# Patient Record
Sex: MALE | Race: WHITE | NOT HISPANIC OR LATINO | URBAN - METROPOLITAN AREA
[De-identification: names, ages, dates, MRNs, and addresses within clinical notes are randomized per-mention and may not be internally consistent; named-entity substitution may affect disease eponyms.]

---

## 2017-02-03 ENCOUNTER — APPOINTMENT (EMERGENCY)
Dept: CT IMAGING | Facility: HOSPITAL | Age: 52
End: 2017-02-03
Payer: COMMERCIAL

## 2017-02-03 ENCOUNTER — HOSPITAL ENCOUNTER (EMERGENCY)
Facility: HOSPITAL | Age: 52
Discharge: HOME | End: 2017-02-03
Admitting: EMERGENCY MEDICINE
Payer: COMMERCIAL

## 2017-02-03 VITALS
HEART RATE: 78 BPM | RESPIRATION RATE: 18 BRPM | BODY MASS INDEX: 24.34 KG/M2 | TEMPERATURE: 98 F | OXYGEN SATURATION: 99 % | DIASTOLIC BLOOD PRESSURE: 88 MMHG | WEIGHT: 170 LBS | HEIGHT: 70 IN | SYSTOLIC BLOOD PRESSURE: 129 MMHG

## 2017-02-03 DIAGNOSIS — N20.0 KIDNEY STONE ON LEFT SIDE: Primary | ICD-10-CM

## 2017-02-03 LAB
ALBUMIN SERPL BCP-MCNC: 4.1 G/DL (ref 3.5–5)
ALP SERPL-CCNC: 100 U/L (ref 46–116)
ALT SERPL W P-5'-P-CCNC: 32 U/L (ref 12–78)
ANION GAP SERPL CALCULATED.3IONS-SCNC: 9 MMOL/L (ref 4–13)
AST SERPL W P-5'-P-CCNC: 21 U/L (ref 5–45)
BASOPHILS # BLD AUTO: 0.03 THOUSANDS/ΜL (ref 0–0.1)
BASOPHILS NFR BLD AUTO: 0 % (ref 0–1)
BILIRUB SERPL-MCNC: 0.4 MG/DL (ref 0.2–1)
BUN SERPL-MCNC: 13 MG/DL (ref 5–25)
CALCIUM SERPL-MCNC: 9 MG/DL (ref 8.3–10.1)
CHLORIDE SERPL-SCNC: 101 MMOL/L (ref 100–108)
CLARITY, POC: CLEAR
CO2 SERPL-SCNC: 28 MMOL/L (ref 21–32)
COLOR, POC: YELLOW
CREAT SERPL-MCNC: 1.22 MG/DL (ref 0.6–1.3)
EOSINOPHIL # BLD AUTO: 0.06 THOUSAND/ΜL (ref 0–0.61)
EOSINOPHIL NFR BLD AUTO: 1 % (ref 0–6)
ERYTHROCYTE [DISTWIDTH] IN BLOOD BY AUTOMATED COUNT: 15.3 % (ref 11.6–15.1)
EXT BILIRUBIN, UA: NORMAL
EXT BLOOD URINE: NORMAL
EXT GLUCOSE, UA: NORMAL
EXT KETONES: NORMAL
EXT NITRITE, UA: NORMAL
EXT PH, UA: 7
EXT PROTEIN, UA: NORMAL
EXT SPECIFIC GRAVITY, UA: 1.01
EXT UROBILINOGEN: NORMAL
GFR SERPL CREATININE-BSD FRML MDRD: >60 ML/MIN/1.73SQ M
GLUCOSE SERPL-MCNC: 118 MG/DL (ref 65–140)
HCT VFR BLD AUTO: 45 % (ref 36.5–49.3)
HGB BLD-MCNC: 15.2 G/DL (ref 12–17)
LIPASE SERPL-CCNC: 108 U/L (ref 73–393)
LYMPHOCYTES # BLD AUTO: 1.16 THOUSANDS/ΜL (ref 0.6–4.47)
LYMPHOCYTES NFR BLD AUTO: 12 % (ref 14–44)
MCH RBC QN AUTO: 26.9 PG (ref 26.8–34.3)
MCHC RBC AUTO-ENTMCNC: 33.8 G/DL (ref 31.4–37.4)
MCV RBC AUTO: 80 FL (ref 82–98)
MONOCYTES # BLD AUTO: 0.55 THOUSAND/ΜL (ref 0.17–1.22)
MONOCYTES NFR BLD AUTO: 6 % (ref 4–12)
NEUTROPHILS # BLD AUTO: 7.63 THOUSANDS/ΜL (ref 1.85–7.62)
NEUTS SEG NFR BLD AUTO: 81 % (ref 43–75)
PLATELET # BLD AUTO: 176 THOUSANDS/UL (ref 149–390)
PMV BLD AUTO: 10.6 FL (ref 8.9–12.7)
POTASSIUM SERPL-SCNC: 3.9 MMOL/L (ref 3.5–5.3)
PROT SERPL-MCNC: 8 G/DL (ref 6.4–8.2)
RBC # BLD AUTO: 5.66 MILLION/UL (ref 3.88–5.62)
SODIUM SERPL-SCNC: 138 MMOL/L (ref 136–145)
WBC # BLD AUTO: 9.43 THOUSAND/UL (ref 4.31–10.16)
WBC # BLD EST: NORMAL 10*3/UL

## 2017-02-03 PROCEDURE — 81002 URINALYSIS NONAUTO W/O SCOPE: CPT | Performed by: PHYSICIAN ASSISTANT

## 2017-02-03 PROCEDURE — 74177 CT ABD & PELVIS W/CONTRAST: CPT

## 2017-02-03 PROCEDURE — 96361 HYDRATE IV INFUSION ADD-ON: CPT

## 2017-02-03 PROCEDURE — 99284 EMERGENCY DEPT VISIT MOD MDM: CPT

## 2017-02-03 PROCEDURE — 85025 COMPLETE CBC W/AUTO DIFF WBC: CPT | Performed by: PHYSICIAN ASSISTANT

## 2017-02-03 PROCEDURE — 80053 COMPREHEN METABOLIC PANEL: CPT | Performed by: PHYSICIAN ASSISTANT

## 2017-02-03 PROCEDURE — 96375 TX/PRO/DX INJ NEW DRUG ADDON: CPT

## 2017-02-03 PROCEDURE — 96374 THER/PROPH/DIAG INJ IV PUSH: CPT

## 2017-02-03 PROCEDURE — 83690 ASSAY OF LIPASE: CPT | Performed by: PHYSICIAN ASSISTANT

## 2017-02-03 PROCEDURE — 36415 COLL VENOUS BLD VENIPUNCTURE: CPT | Performed by: PHYSICIAN ASSISTANT

## 2017-02-03 RX ORDER — KETOROLAC TROMETHAMINE 30 MG/ML
30 INJECTION, SOLUTION INTRAMUSCULAR; INTRAVENOUS ONCE
Status: COMPLETED | OUTPATIENT
Start: 2017-02-03 | End: 2017-02-03

## 2017-02-03 RX ORDER — OXYCODONE HYDROCHLORIDE AND ACETAMINOPHEN 5; 325 MG/1; MG/1
1 TABLET ORAL EVERY 4 HOURS PRN
Qty: 18 TABLET | Refills: 0 | Status: SHIPPED | OUTPATIENT
Start: 2017-02-03 | End: 2017-06-02

## 2017-02-03 RX ORDER — ONDANSETRON 2 MG/ML
4 INJECTION INTRAMUSCULAR; INTRAVENOUS ONCE
Status: COMPLETED | OUTPATIENT
Start: 2017-02-03 | End: 2017-02-03

## 2017-02-03 RX ORDER — TAMSULOSIN HYDROCHLORIDE 0.4 MG/1
0.4 CAPSULE ORAL
Qty: 7 CAPSULE | Refills: 0 | Status: SHIPPED | OUTPATIENT
Start: 2017-02-03 | End: 2017-06-02

## 2017-02-03 RX ADMIN — IOHEXOL 100 ML: 350 INJECTION, SOLUTION INTRAVENOUS at 13:58

## 2017-02-03 RX ADMIN — SODIUM CHLORIDE 1000 ML: 0.9 INJECTION, SOLUTION INTRAVENOUS at 13:04

## 2017-02-03 RX ADMIN — KETOROLAC TROMETHAMINE 30 MG: 30 INJECTION, SOLUTION INTRAMUSCULAR at 13:03

## 2017-02-03 RX ADMIN — ONDANSETRON 4 MG: 2 INJECTION INTRAMUSCULAR; INTRAVENOUS at 13:02

## 2017-02-09 ENCOUNTER — ANESTHESIA (OUTPATIENT)
Dept: PERIOP | Facility: HOSPITAL | Age: 52
End: 2017-02-09
Payer: COMMERCIAL

## 2017-02-09 ENCOUNTER — APPOINTMENT (OUTPATIENT)
Dept: RADIOLOGY | Facility: HOSPITAL | Age: 52
End: 2017-02-09
Payer: COMMERCIAL

## 2017-02-09 ENCOUNTER — HOSPITAL ENCOUNTER (OUTPATIENT)
Facility: HOSPITAL | Age: 52
Setting detail: OUTPATIENT SURGERY
Discharge: HOME | End: 2017-02-09
Attending: UROLOGY | Admitting: UROLOGY
Payer: COMMERCIAL

## 2017-02-09 ENCOUNTER — ANESTHESIA EVENT (OUTPATIENT)
Dept: PERIOP | Facility: HOSPITAL | Age: 52
End: 2017-02-09
Payer: COMMERCIAL

## 2017-02-09 VITALS
DIASTOLIC BLOOD PRESSURE: 78 MMHG | HEIGHT: 70 IN | BODY MASS INDEX: 24.34 KG/M2 | OXYGEN SATURATION: 93 % | TEMPERATURE: 98.8 F | HEART RATE: 74 BPM | RESPIRATION RATE: 16 BRPM | WEIGHT: 170 LBS | SYSTOLIC BLOOD PRESSURE: 133 MMHG

## 2017-02-09 DIAGNOSIS — N20.1 CALCULUS OF URETER: ICD-10-CM

## 2017-02-09 PROCEDURE — C1769 GUIDE WIRE: HCPCS | Performed by: UROLOGY

## 2017-02-09 PROCEDURE — 74420 UROGRAPHY RTRGR +-KUB: CPT

## 2017-02-09 PROCEDURE — 87086 URINE CULTURE/COLONY COUNT: CPT | Performed by: UROLOGY

## 2017-02-09 PROCEDURE — C2617 STENT, NON-COR, TEM W/O DEL: HCPCS | Performed by: UROLOGY

## 2017-02-09 PROCEDURE — 82360 CALCULUS ASSAY QUANT: CPT | Performed by: UROLOGY

## 2017-02-09 DEVICE — STENT URETERAL 4.7FR 26CM INLAY OPTIMA: Type: IMPLANTABLE DEVICE | Site: KIDNEY | Status: FUNCTIONAL

## 2017-02-09 RX ORDER — FENTANYL CITRATE 50 UG/ML
INJECTION, SOLUTION INTRAMUSCULAR; INTRAVENOUS AS NEEDED
Status: DISCONTINUED | OUTPATIENT
Start: 2017-02-09 | End: 2017-02-09 | Stop reason: SURG

## 2017-02-09 RX ORDER — SUCCINYLCHOLINE CHLORIDE 20 MG/ML
INJECTION INTRAMUSCULAR; INTRAVENOUS AS NEEDED
Status: DISCONTINUED | OUTPATIENT
Start: 2017-02-09 | End: 2017-02-09 | Stop reason: SURG

## 2017-02-09 RX ORDER — CEFUROXIME AXETIL 500 MG/1
500 TABLET ORAL EVERY 12 HOURS SCHEDULED
Qty: 6 TABLET | Refills: 0 | Status: SHIPPED | OUTPATIENT
Start: 2017-02-09 | End: 2017-02-12

## 2017-02-09 RX ORDER — LIDOCAINE HYDROCHLORIDE 10 MG/ML
INJECTION, SOLUTION INFILTRATION; PERINEURAL AS NEEDED
Status: DISCONTINUED | OUTPATIENT
Start: 2017-02-09 | End: 2017-02-09 | Stop reason: SURG

## 2017-02-09 RX ORDER — SODIUM CHLORIDE, SODIUM LACTATE, POTASSIUM CHLORIDE, CALCIUM CHLORIDE 600; 310; 30; 20 MG/100ML; MG/100ML; MG/100ML; MG/100ML
20 INJECTION, SOLUTION INTRAVENOUS CONTINUOUS
Status: DISCONTINUED | OUTPATIENT
Start: 2017-02-09 | End: 2017-02-09 | Stop reason: HOSPADM

## 2017-02-09 RX ORDER — PROPOFOL 10 MG/ML
INJECTION, EMULSION INTRAVENOUS AS NEEDED
Status: DISCONTINUED | OUTPATIENT
Start: 2017-02-09 | End: 2017-02-09 | Stop reason: SURG

## 2017-02-09 RX ORDER — ONDANSETRON 2 MG/ML
INJECTION INTRAMUSCULAR; INTRAVENOUS AS NEEDED
Status: DISCONTINUED | OUTPATIENT
Start: 2017-02-09 | End: 2017-02-09 | Stop reason: SURG

## 2017-02-09 RX ORDER — MIDAZOLAM HYDROCHLORIDE 1 MG/ML
INJECTION INTRAMUSCULAR; INTRAVENOUS AS NEEDED
Status: DISCONTINUED | OUTPATIENT
Start: 2017-02-09 | End: 2017-02-09 | Stop reason: SURG

## 2017-02-09 RX ORDER — OXYCODONE HYDROCHLORIDE AND ACETAMINOPHEN 5; 325 MG/1; MG/1
1 TABLET ORAL EVERY 4 HOURS PRN
Status: DISCONTINUED | OUTPATIENT
Start: 2017-02-09 | End: 2017-02-09 | Stop reason: HOSPADM

## 2017-02-09 RX ORDER — ONDANSETRON 2 MG/ML
4 INJECTION INTRAMUSCULAR; INTRAVENOUS ONCE
Status: DISCONTINUED | OUTPATIENT
Start: 2017-02-09 | End: 2017-02-09 | Stop reason: HOSPADM

## 2017-02-09 RX ORDER — SODIUM CHLORIDE, SODIUM LACTATE, POTASSIUM CHLORIDE, CALCIUM CHLORIDE 600; 310; 30; 20 MG/100ML; MG/100ML; MG/100ML; MG/100ML
150 INJECTION, SOLUTION INTRAVENOUS CONTINUOUS
Status: DISCONTINUED | OUTPATIENT
Start: 2017-02-09 | End: 2017-02-09 | Stop reason: HOSPADM

## 2017-02-09 RX ORDER — MAGNESIUM HYDROXIDE 1200 MG/15ML
LIQUID ORAL AS NEEDED
Status: DISCONTINUED | OUTPATIENT
Start: 2017-02-09 | End: 2017-02-09 | Stop reason: HOSPADM

## 2017-02-09 RX ORDER — OXYCODONE HYDROCHLORIDE AND ACETAMINOPHEN 5; 325 MG/1; MG/1
1 TABLET ORAL ONCE
Status: COMPLETED | OUTPATIENT
Start: 2017-02-09 | End: 2017-02-09

## 2017-02-09 RX ORDER — OXYCODONE HYDROCHLORIDE AND ACETAMINOPHEN 5; 325 MG/1; MG/1
1 TABLET ORAL EVERY 4 HOURS PRN
Qty: 12 TABLET | Refills: 0 | Status: SHIPPED | OUTPATIENT
Start: 2017-02-09 | End: 2017-02-12

## 2017-02-09 RX ADMIN — SODIUM CHLORIDE, SODIUM LACTATE, POTASSIUM CHLORIDE, AND CALCIUM CHLORIDE 20 ML/HR: .6; .31; .03; .02 INJECTION, SOLUTION INTRAVENOUS at 10:55

## 2017-02-09 RX ADMIN — MIDAZOLAM HYDROCHLORIDE 2 MG: 1 INJECTION, SOLUTION INTRAMUSCULAR; INTRAVENOUS at 14:34

## 2017-02-09 RX ADMIN — LIDOCAINE HYDROCHLORIDE 50 MG: 10 INJECTION, SOLUTION INFILTRATION; PERINEURAL at 14:40

## 2017-02-09 RX ADMIN — OXYCODONE HYDROCHLORIDE AND ACETAMINOPHEN 1 TABLET: 5; 325 TABLET ORAL at 12:42

## 2017-02-09 RX ADMIN — SUCCINYLCHOLINE CHLORIDE 100 MG: 20 INJECTION, SOLUTION INTRAMUSCULAR; INTRAVENOUS at 14:40

## 2017-02-09 RX ADMIN — FENTANYL CITRATE 50 MCG: 50 INJECTION, SOLUTION INTRAMUSCULAR; INTRAVENOUS at 14:40

## 2017-02-09 RX ADMIN — PROPOFOL 200 MG: 10 INJECTION, EMULSION INTRAVENOUS at 14:40

## 2017-02-09 RX ADMIN — CEFAZOLIN SODIUM 2000 MG: 2 SOLUTION INTRAVENOUS at 14:40

## 2017-02-09 RX ADMIN — DEXAMETHASONE SODIUM PHOSPHATE 10 MG: 10 INJECTION INTRAMUSCULAR; INTRAVENOUS at 14:40

## 2017-02-09 RX ADMIN — ONDANSETRON 4 MG: 2 INJECTION INTRAMUSCULAR; INTRAVENOUS at 15:06

## 2017-02-11 LAB — BACTERIA UR CULT: NORMAL

## 2017-02-14 LAB
CA PHOS MFR STONE: 5 %
CALCIUM OXALATE DIHYDRATE MFR STONE IR: 10 %
COLOR STONE: NORMAL
COM MFR STONE: 85 %
COMMENT-STONE3: NORMAL
COMPOSITION: NORMAL
LABORATORY COMMENT REPORT: NORMAL
NIDUS STONE QL: NORMAL
PHOTO: NORMAL
SIZE STONE: NORMAL MM
STONE ANALYSIS-IMP: NORMAL
SURFACE CRYSTALS: NORMAL
WT STONE: 28 MG

## 2017-06-02 ENCOUNTER — HOSPITAL ENCOUNTER (EMERGENCY)
Facility: HOSPITAL | Age: 52
Discharge: HOME | End: 2017-06-02
Attending: EMERGENCY MEDICINE | Admitting: EMERGENCY MEDICINE
Payer: COMMERCIAL

## 2017-06-02 ENCOUNTER — APPOINTMENT (EMERGENCY)
Dept: RADIOLOGY | Facility: HOSPITAL | Age: 52
End: 2017-06-02
Payer: COMMERCIAL

## 2017-06-02 ENCOUNTER — GENERIC CONVERSION - ENCOUNTER (OUTPATIENT)
Dept: OTHER | Facility: OTHER | Age: 52
End: 2017-06-02

## 2017-06-02 VITALS
OXYGEN SATURATION: 97 % | BODY MASS INDEX: 24.34 KG/M2 | SYSTOLIC BLOOD PRESSURE: 122 MMHG | DIASTOLIC BLOOD PRESSURE: 77 MMHG | RESPIRATION RATE: 16 BRPM | HEIGHT: 70 IN | TEMPERATURE: 98.2 F | HEART RATE: 75 BPM | WEIGHT: 170 LBS

## 2017-06-02 DIAGNOSIS — R07.9 CHEST PAIN: Primary | ICD-10-CM

## 2017-06-02 DIAGNOSIS — L23.7 POISON IVY: ICD-10-CM

## 2017-06-02 LAB
ALBUMIN SERPL BCP-MCNC: 3.8 G/DL (ref 3.5–5)
ALP SERPL-CCNC: 87 U/L (ref 46–116)
ALT SERPL W P-5'-P-CCNC: 33 U/L (ref 12–78)
ANION GAP SERPL CALCULATED.3IONS-SCNC: 5 MMOL/L (ref 4–13)
AST SERPL W P-5'-P-CCNC: 30 U/L (ref 5–45)
ATRIAL RATE: 69 BPM
ATRIAL RATE: 79 BPM
BASOPHILS # BLD AUTO: 0.01 THOUSANDS/ΜL (ref 0–0.1)
BASOPHILS NFR BLD AUTO: 0 % (ref 0–1)
BILIRUB DIRECT SERPL-MCNC: 0.06 MG/DL (ref 0–0.2)
BILIRUB SERPL-MCNC: 0.46 MG/DL (ref 0.2–1)
BUN SERPL-MCNC: 13 MG/DL (ref 5–25)
CALCIUM SERPL-MCNC: 9.4 MG/DL (ref 8.3–10.1)
CHLORIDE SERPL-SCNC: 105 MMOL/L (ref 100–108)
CO2 SERPL-SCNC: 30 MMOL/L (ref 21–32)
CREAT SERPL-MCNC: 1.15 MG/DL (ref 0.6–1.3)
DEPRECATED D DIMER PPP: 1570 NG/ML (FEU) (ref 0–424)
EOSINOPHIL # BLD AUTO: 0.33 THOUSAND/ΜL (ref 0–0.61)
EOSINOPHIL NFR BLD AUTO: 7 % (ref 0–6)
ERYTHROCYTE [DISTWIDTH] IN BLOOD BY AUTOMATED COUNT: 13.9 % (ref 11.6–15.1)
GFR SERPL CREATININE-BSD FRML MDRD: >60 ML/MIN/1.73SQ M
GLUCOSE SERPL-MCNC: 88 MG/DL (ref 65–140)
HCT VFR BLD AUTO: 47.1 % (ref 36.5–49.3)
HGB BLD-MCNC: 15.8 G/DL (ref 12–17)
HOLD SPECIMEN: NORMAL
LYMPHOCYTES # BLD AUTO: 0.98 THOUSANDS/ΜL (ref 0.6–4.47)
LYMPHOCYTES NFR BLD AUTO: 20 % (ref 14–44)
MCH RBC QN AUTO: 28.7 PG (ref 26.8–34.3)
MCHC RBC AUTO-ENTMCNC: 33.5 G/DL (ref 31.4–37.4)
MCV RBC AUTO: 86 FL (ref 82–98)
MONOCYTES # BLD AUTO: 0.27 THOUSAND/ΜL (ref 0.17–1.22)
MONOCYTES NFR BLD AUTO: 6 % (ref 4–12)
NEUTROPHILS # BLD AUTO: 3.34 THOUSANDS/ΜL (ref 1.85–7.62)
NEUTS SEG NFR BLD AUTO: 67 % (ref 43–75)
NRBC BLD AUTO-RTO: 0 /100 WBCS
P AXIS: 21 DEGREES
P AXIS: 66 DEGREES
PLATELET # BLD AUTO: 161 THOUSANDS/UL (ref 149–390)
PMV BLD AUTO: 10.5 FL (ref 8.9–12.7)
POTASSIUM SERPL-SCNC: 5.4 MMOL/L (ref 3.5–5.3)
PR INTERVAL: 144 MS
PR INTERVAL: 144 MS
PROT SERPL-MCNC: 7.5 G/DL (ref 6.4–8.2)
QRS AXIS: 6 DEGREES
QRS AXIS: 8 DEGREES
QRSD INTERVAL: 80 MS
QRSD INTERVAL: 84 MS
QT INTERVAL: 352 MS
QT INTERVAL: 376 MS
QTC INTERVAL: 402 MS
QTC INTERVAL: 403 MS
RBC # BLD AUTO: 5.51 MILLION/UL (ref 3.88–5.62)
SODIUM SERPL-SCNC: 140 MMOL/L (ref 136–145)
SPECIMEN SOURCE: NORMAL
SPECIMEN SOURCE: NORMAL
T WAVE AXIS: 39 DEGREES
T WAVE AXIS: 45 DEGREES
TROPONIN I BLD-MCNC: 0 NG/ML (ref 0–0.08)
TROPONIN I BLD-MCNC: 0 NG/ML (ref 0–0.08)
VENTRICULAR RATE: 69 BPM
VENTRICULAR RATE: 79 BPM
WBC # BLD AUTO: 4.95 THOUSAND/UL (ref 4.31–10.16)

## 2017-06-02 PROCEDURE — 93005 ELECTROCARDIOGRAM TRACING: CPT | Performed by: EMERGENCY MEDICINE

## 2017-06-02 PROCEDURE — 85379 FIBRIN DEGRADATION QUANT: CPT | Performed by: EMERGENCY MEDICINE

## 2017-06-02 PROCEDURE — 80076 HEPATIC FUNCTION PANEL: CPT | Performed by: EMERGENCY MEDICINE

## 2017-06-02 PROCEDURE — 36415 COLL VENOUS BLD VENIPUNCTURE: CPT

## 2017-06-02 PROCEDURE — 84484 ASSAY OF TROPONIN QUANT: CPT

## 2017-06-02 PROCEDURE — 93005 ELECTROCARDIOGRAM TRACING: CPT

## 2017-06-02 PROCEDURE — 80048 BASIC METABOLIC PNL TOTAL CA: CPT

## 2017-06-02 PROCEDURE — 99285 EMERGENCY DEPT VISIT HI MDM: CPT

## 2017-06-02 PROCEDURE — 85025 COMPLETE CBC W/AUTO DIFF WBC: CPT | Performed by: EMERGENCY MEDICINE

## 2017-06-02 PROCEDURE — 71020 HB CHEST X-RAY 2VW FRONTAL&LATL: CPT

## 2017-06-02 PROCEDURE — 71275 CT ANGIOGRAPHY CHEST: CPT

## 2017-06-02 RX ORDER — PREDNISONE 20 MG/1
40 TABLET ORAL ONCE
Status: COMPLETED | OUTPATIENT
Start: 2017-06-02 | End: 2017-06-02

## 2017-06-02 RX ORDER — PREDNISONE 10 MG/1
TABLET ORAL
Qty: 35 TABLET | Refills: 0 | Status: SHIPPED | OUTPATIENT
Start: 2017-06-02 | End: 2018-06-29

## 2017-06-02 RX ADMIN — PREDNISONE 40 MG: 20 TABLET ORAL at 12:52

## 2017-06-02 RX ADMIN — IOHEXOL 85 ML: 350 INJECTION, SOLUTION INTRAVENOUS at 11:33

## 2017-06-05 ENCOUNTER — ALLSCRIPTS OFFICE VISIT (OUTPATIENT)
Dept: OTHER | Facility: OTHER | Age: 52
End: 2017-06-05

## 2017-06-05 DIAGNOSIS — R07.9 CHEST PAIN: ICD-10-CM

## 2017-06-05 DIAGNOSIS — E78.00 PURE HYPERCHOLESTEROLEMIA: ICD-10-CM

## 2017-06-12 ENCOUNTER — TRANSCRIBE ORDERS (OUTPATIENT)
Dept: LAB | Age: 52
End: 2017-06-12

## 2017-06-12 ENCOUNTER — APPOINTMENT (OUTPATIENT)
Dept: LAB | Age: 52
End: 2017-06-12
Payer: COMMERCIAL

## 2017-06-12 ENCOUNTER — HOSPITAL ENCOUNTER (OUTPATIENT)
Dept: NON INVASIVE DIAGNOSTICS | Facility: CLINIC | Age: 52
Discharge: HOME | End: 2017-06-12
Payer: COMMERCIAL

## 2017-06-12 DIAGNOSIS — E78.00 PURE HYPERCHOLESTEROLEMIA: ICD-10-CM

## 2017-06-12 DIAGNOSIS — R07.9 CHEST PAIN: ICD-10-CM

## 2017-06-12 LAB
ALBUMIN SERPL BCP-MCNC: 3.6 G/DL (ref 3.5–5)
ALP SERPL-CCNC: 80 U/L (ref 46–116)
ALT SERPL W P-5'-P-CCNC: 24 U/L (ref 12–78)
ANION GAP SERPL CALCULATED.3IONS-SCNC: 5 MMOL/L (ref 4–13)
AST SERPL W P-5'-P-CCNC: 17 U/L (ref 5–45)
BASOPHILS # BLD AUTO: 0.03 THOUSANDS/ΜL (ref 0–0.1)
BASOPHILS NFR BLD AUTO: 1 % (ref 0–1)
BILIRUB SERPL-MCNC: 0.4 MG/DL (ref 0.2–1)
BILIRUB UR QL STRIP: NEGATIVE
BUN SERPL-MCNC: 15 MG/DL (ref 5–25)
CALCIUM SERPL-MCNC: 8.7 MG/DL (ref 8.3–10.1)
CHEST PAIN STATEMENT: NORMAL
CHLORIDE SERPL-SCNC: 106 MMOL/L (ref 100–108)
CHOLEST SERPL-MCNC: 236 MG/DL (ref 50–200)
CLARITY UR: CLEAR
CO2 SERPL-SCNC: 29 MMOL/L (ref 21–32)
COLOR UR: YELLOW
CREAT SERPL-MCNC: 1.04 MG/DL (ref 0.6–1.3)
EOSINOPHIL # BLD AUTO: 0.16 THOUSAND/ΜL (ref 0–0.61)
EOSINOPHIL NFR BLD AUTO: 4 % (ref 0–6)
ERYTHROCYTE [DISTWIDTH] IN BLOOD BY AUTOMATED COUNT: 14.1 % (ref 11.6–15.1)
GFR SERPL CREATININE-BSD FRML MDRD: >60 ML/MIN/1.73SQ M
GLUCOSE P FAST SERPL-MCNC: 92 MG/DL (ref 65–99)
GLUCOSE UR STRIP-MCNC: NEGATIVE MG/DL
HCT VFR BLD AUTO: 44.5 % (ref 36.5–49.3)
HDLC SERPL-MCNC: 38 MG/DL (ref 40–60)
HGB BLD-MCNC: 14.7 G/DL (ref 12–17)
HGB UR QL STRIP.AUTO: NEGATIVE
KETONES UR STRIP-MCNC: NEGATIVE MG/DL
LDLC SERPL CALC-MCNC: 157 MG/DL (ref 0–100)
LEUKOCYTE ESTERASE UR QL STRIP: NEGATIVE
LYMPHOCYTES # BLD AUTO: 1.61 THOUSANDS/ΜL (ref 0.6–4.47)
LYMPHOCYTES NFR BLD AUTO: 36 % (ref 14–44)
MAX DIASTOLIC BP: 82 MMHG
MAX HEART RATE: 153 BPM
MAX PREDICTED HEART RATE: 168 BPM
MAX. SYSTOLIC BP: 178 MMHG
MCH RBC QN AUTO: 28.7 PG (ref 26.8–34.3)
MCHC RBC AUTO-ENTMCNC: 33 G/DL (ref 31.4–37.4)
MCV RBC AUTO: 87 FL (ref 82–98)
MONOCYTES # BLD AUTO: 0.36 THOUSAND/ΜL (ref 0.17–1.22)
MONOCYTES NFR BLD AUTO: 8 % (ref 4–12)
NEUTROPHILS # BLD AUTO: 2.24 THOUSANDS/ΜL (ref 1.85–7.62)
NEUTS SEG NFR BLD AUTO: 51 % (ref 43–75)
NITRITE UR QL STRIP: NEGATIVE
NRBC BLD AUTO-RTO: 0 /100 WBCS
PH UR STRIP.AUTO: 6.5 [PH] (ref 4.5–8)
PLATELET # BLD AUTO: 159 THOUSANDS/UL (ref 149–390)
PMV BLD AUTO: 10.8 FL (ref 8.9–12.7)
POTASSIUM SERPL-SCNC: 4.5 MMOL/L (ref 3.5–5.3)
PROT SERPL-MCNC: 7 G/DL (ref 6.4–8.2)
PROT UR STRIP-MCNC: NEGATIVE MG/DL
PROTOCOL NAME: NORMAL
RBC # BLD AUTO: 5.12 MILLION/UL (ref 3.88–5.62)
SODIUM SERPL-SCNC: 140 MMOL/L (ref 136–145)
SP GR UR STRIP.AUTO: 1 (ref 1–1.03)
T4 FREE SERPL-MCNC: 0.93 NG/DL (ref 0.76–1.46)
TARGET HR FORMULA: NORMAL
TEST INDICATION: NORMAL
TIME IN EXERCISE PHASE: 610 S
TRIGL SERPL-MCNC: 206 MG/DL
TSH SERPL DL<=0.05 MIU/L-ACNC: 3.9 UIU/ML (ref 0.36–3.74)
UROBILINOGEN UR QL STRIP.AUTO: 0.2 E.U./DL
WBC # BLD AUTO: 4.42 THOUSAND/UL (ref 4.31–10.16)

## 2017-06-12 PROCEDURE — 84443 ASSAY THYROID STIM HORMONE: CPT

## 2017-06-12 PROCEDURE — 84439 ASSAY OF FREE THYROXINE: CPT

## 2017-06-12 PROCEDURE — 36415 COLL VENOUS BLD VENIPUNCTURE: CPT

## 2017-06-12 PROCEDURE — 80061 LIPID PANEL: CPT

## 2017-06-12 PROCEDURE — 80053 COMPREHEN METABOLIC PANEL: CPT

## 2017-06-12 PROCEDURE — 93017 CV STRESS TEST TRACING ONLY: CPT

## 2017-06-12 PROCEDURE — 81003 URINALYSIS AUTO W/O SCOPE: CPT

## 2017-06-12 PROCEDURE — 85025 COMPLETE CBC W/AUTO DIFF WBC: CPT

## 2017-06-28 ENCOUNTER — ALLSCRIPTS OFFICE VISIT (OUTPATIENT)
Dept: OTHER | Facility: OTHER | Age: 52
End: 2017-06-28

## 2017-09-09 ENCOUNTER — APPOINTMENT (OUTPATIENT)
Dept: LAB | Age: 52
End: 2017-09-09
Payer: COMMERCIAL

## 2017-09-09 ENCOUNTER — TRANSCRIBE ORDERS (OUTPATIENT)
Dept: ADMINISTRATIVE | Age: 52
End: 2017-09-09

## 2017-09-09 DIAGNOSIS — N40.0 BENIGN PROSTATIC HYPERPLASIA, PRESENCE OF LOWER URINARY TRACT SYMPTOMS UNSPECIFIED, UNSPECIFIED MORPHOLOGY: Primary | ICD-10-CM

## 2017-09-09 DIAGNOSIS — N40.0 BENIGN PROSTATIC HYPERPLASIA, PRESENCE OF LOWER URINARY TRACT SYMPTOMS UNSPECIFIED, UNSPECIFIED MORPHOLOGY: ICD-10-CM

## 2017-09-09 DIAGNOSIS — E78.00 PURE HYPERCHOLESTEROLEMIA: ICD-10-CM

## 2017-09-09 LAB
ALBUMIN SERPL BCP-MCNC: 4.1 G/DL (ref 3.5–5)
ALP SERPL-CCNC: 90 U/L (ref 46–116)
ALT SERPL W P-5'-P-CCNC: 29 U/L (ref 12–78)
ANION GAP SERPL CALCULATED.3IONS-SCNC: 3 MMOL/L (ref 4–13)
AST SERPL W P-5'-P-CCNC: 18 U/L (ref 5–45)
BILIRUB SERPL-MCNC: 0.62 MG/DL (ref 0.2–1)
BUN SERPL-MCNC: 14 MG/DL (ref 5–25)
CALCIUM SERPL-MCNC: 9 MG/DL (ref 8.3–10.1)
CHLORIDE SERPL-SCNC: 105 MMOL/L (ref 100–108)
CO2 SERPL-SCNC: 31 MMOL/L (ref 21–32)
CREAT SERPL-MCNC: 0.98 MG/DL (ref 0.6–1.3)
GFR SERPL CREATININE-BSD FRML MDRD: 88 ML/MIN/1.73SQ M
GLUCOSE P FAST SERPL-MCNC: 106 MG/DL (ref 65–99)
POTASSIUM SERPL-SCNC: 4.4 MMOL/L (ref 3.5–5.3)
PROT SERPL-MCNC: 7.7 G/DL (ref 6.4–8.2)
PSA SERPL-MCNC: 0.5 NG/ML (ref 0–4)
SODIUM SERPL-SCNC: 139 MMOL/L (ref 136–145)

## 2017-09-09 PROCEDURE — 84153 ASSAY OF PSA TOTAL: CPT

## 2017-09-09 PROCEDURE — 36415 COLL VENOUS BLD VENIPUNCTURE: CPT

## 2017-09-09 PROCEDURE — 80053 COMPREHEN METABOLIC PANEL: CPT

## 2017-12-23 ENCOUNTER — TRANSCRIBE ORDERS (OUTPATIENT)
Dept: ADMINISTRATIVE | Age: 52
End: 2017-12-23

## 2017-12-23 ENCOUNTER — APPOINTMENT (OUTPATIENT)
Dept: LAB | Age: 52
End: 2017-12-23
Payer: COMMERCIAL

## 2017-12-23 DIAGNOSIS — E78.00 PURE HYPERCHOLESTEROLEMIA: ICD-10-CM

## 2017-12-23 LAB
ALBUMIN SERPL BCP-MCNC: 4.1 G/DL (ref 3.5–5)
ALP SERPL-CCNC: 86 U/L (ref 46–116)
ALT SERPL W P-5'-P-CCNC: 42 U/L (ref 12–78)
ANION GAP SERPL CALCULATED.3IONS-SCNC: 3 MMOL/L (ref 4–13)
AST SERPL W P-5'-P-CCNC: 21 U/L (ref 5–45)
BASOPHILS # BLD AUTO: 0.02 THOUSANDS/ΜL (ref 0–0.1)
BASOPHILS NFR BLD AUTO: 0 % (ref 0–1)
BILIRUB SERPL-MCNC: 0.39 MG/DL (ref 0.2–1)
BUN SERPL-MCNC: 18 MG/DL (ref 5–25)
CALCIUM SERPL-MCNC: 9.1 MG/DL (ref 8.3–10.1)
CHLORIDE SERPL-SCNC: 104 MMOL/L (ref 100–108)
CHOLEST SERPL-MCNC: 135 MG/DL (ref 50–200)
CO2 SERPL-SCNC: 31 MMOL/L (ref 21–32)
CREAT SERPL-MCNC: 1.08 MG/DL (ref 0.6–1.3)
EOSINOPHIL # BLD AUTO: 0.11 THOUSAND/ΜL (ref 0–0.61)
EOSINOPHIL NFR BLD AUTO: 2 % (ref 0–6)
ERYTHROCYTE [DISTWIDTH] IN BLOOD BY AUTOMATED COUNT: 12.9 % (ref 11.6–15.1)
GFR SERPL CREATININE-BSD FRML MDRD: 78 ML/MIN/1.73SQ M
GLUCOSE P FAST SERPL-MCNC: 100 MG/DL (ref 65–99)
HCT VFR BLD AUTO: 45.8 % (ref 36.5–49.3)
HDLC SERPL-MCNC: 32 MG/DL (ref 40–60)
HGB BLD-MCNC: 16.2 G/DL (ref 12–17)
LDLC SERPL CALC-MCNC: 72 MG/DL (ref 0–100)
LYMPHOCYTES # BLD AUTO: 1.3 THOUSANDS/ΜL (ref 0.6–4.47)
LYMPHOCYTES NFR BLD AUTO: 27 % (ref 14–44)
MCH RBC QN AUTO: 31 PG (ref 26.8–34.3)
MCHC RBC AUTO-ENTMCNC: 35.4 G/DL (ref 31.4–37.4)
MCV RBC AUTO: 88 FL (ref 82–98)
MONOCYTES # BLD AUTO: 0.37 THOUSAND/ΜL (ref 0.17–1.22)
MONOCYTES NFR BLD AUTO: 8 % (ref 4–12)
NEUTROPHILS # BLD AUTO: 2.99 THOUSANDS/ΜL (ref 1.85–7.62)
NEUTS SEG NFR BLD AUTO: 63 % (ref 43–75)
NRBC BLD AUTO-RTO: 0 /100 WBCS
PLATELET # BLD AUTO: 151 THOUSANDS/UL (ref 149–390)
PMV BLD AUTO: 11.4 FL (ref 8.9–12.7)
POTASSIUM SERPL-SCNC: 4.6 MMOL/L (ref 3.5–5.3)
PROT SERPL-MCNC: 7.5 G/DL (ref 6.4–8.2)
RBC # BLD AUTO: 5.23 MILLION/UL (ref 3.88–5.62)
SODIUM SERPL-SCNC: 138 MMOL/L (ref 136–145)
TRIGL SERPL-MCNC: 157 MG/DL
TSH SERPL DL<=0.05 MIU/L-ACNC: 3.62 UIU/ML (ref 0.36–3.74)
WBC # BLD AUTO: 4.81 THOUSAND/UL (ref 4.31–10.16)

## 2017-12-23 PROCEDURE — 80061 LIPID PANEL: CPT

## 2017-12-23 PROCEDURE — 85025 COMPLETE CBC W/AUTO DIFF WBC: CPT

## 2017-12-23 PROCEDURE — 80053 COMPREHEN METABOLIC PANEL: CPT

## 2017-12-23 PROCEDURE — 84443 ASSAY THYROID STIM HORMONE: CPT

## 2017-12-23 PROCEDURE — 36415 COLL VENOUS BLD VENIPUNCTURE: CPT

## 2017-12-28 ENCOUNTER — ALLSCRIPTS OFFICE VISIT (OUTPATIENT)
Dept: OTHER | Facility: OTHER | Age: 52
End: 2017-12-28

## 2017-12-28 DIAGNOSIS — E78.00 PURE HYPERCHOLESTEROLEMIA: ICD-10-CM

## 2017-12-29 NOTE — PROGRESS NOTES
Assessment   1  Hypercholesterolemia (272 0) (E78 00)   2  Subclinical hypothyroidism (244 8) (E03 9)    Plan   Hypercholesterolemia    · Simvastatin 20 MG Oral Tablet; take one tablet at bedtime   · (1) CBC/PLT/DIFF; Status:Active; Requested for:52Oof4204;    · (1) COMPREHENSIVE METABOLIC PANEL; Status:Active; Requested for:01Osz8032;    · (1) LIPID PANEL, FASTING; Status:Active; Requested for:07Dpg9167;    · (1) TSH WITH FT4 REFLEX; Status:Active; Requested for:31Rqw1129;   PMH: Encounter for screening for malignant neoplasm of colon    · COLONOSCOPY; Status:Active; Requested for:41Ehs8464;     Discussion/Summary   Discussion Summary:    HLD: doing well, lipids improved tremendously!! continue with statin  continue low fat/low cholesterol diet  exercise 30 mins x3 a week     hypothyroid: TSH WNL on most recent labs  continue to trend  in 6 months, sooner if needed  have labs completed prior to f/u appt  reviewed  aware needs colonoscopy - order given  Counseling Documentation With Imm: The patient was counseled regarding diagnostic results,-- instructions for management,-- risk factor reductions,-- prognosis,-- patient and family education,-- impressions,-- importance of compliance with treatment  Medication SE Review and Pt Understands Tx: Possible side effects of new medications were reviewed with the patient/guardian today  The treatment plan was reviewed with the patient/guardian  The patient/guardian understands and agrees with the treatment plan      Chief Complaint   Chief Complaint Free Text Note Form: pt here for 6 month f/u of cholesterol blood work      History of Present Illness   HPI: Pt here for f/u  He is doing well with no concerns  Hyperlipidemia (Follow-Up): The patient states his hyperlipidemia has been under good control since the last visit  He has no significant interval events      Symptoms: denies chest pain,-- denies intermittent leg claudication,-- denies muscle pain-- and-- denies muscle weakness  Medications: the patient is adherent with his medication regimen  -- He denies medication side effects  Medication(s): a statin  The patient is doing well with his hyperlipidemia goals The LDL  The LDL is at goal  The triglycerides  The triglyceride level is at goal  The HDL  The HDL is at goal     Hypothyroidism (Follow-Up): The patient is being seen for follow-up of Subclinical hypothyroid  The patient reports doing well  He has had no significant interval events  Interval symptoms:  denies weight gain,-- denies cold intolerance,-- denies fatigue,-- denies weakness-- and-- denies constipation  Associated symptoms: no myalgias  The patient is not currently on medication for this problem  Review of Systems   Complete-Male:      Constitutional: no fever,-- not feeling poorly,-- no chills-- and-- not feeling tired  ENT: no earache,-- no sore throat-- and-- no nasal discharge  Cardiovascular: the heart rate was not slow,-- no chest pain,-- the heart rate was not fast-- and-- no palpitations  Respiratory: no shortness of breath,-- no cough-- and-- no wheezing  Gastrointestinal: no nausea-- and-- no vomiting  Musculoskeletal: no arthralgias-- and-- no myalgias  Integumentary: no rashes  Neurological: no headache,-- no dizziness-- and-- no fainting  Psychiatric: no anxiety-- and-- no depression  Endocrine: no muscle weakness  Active Problems   1  Chest pain at rest (786 50) (R07 9)   2  Degenerative joint disease of knee, left (715 96) (M17 9)   3  Hypercholesterolemia (272 0) (E78 00)   4  Injury of meniscus of knee, left, initial encounter (959 7) (S83 8X2A)   5  Left knee pain (719 46) (M25 562)   6  Primary Raynaud's phenomenon (443 0) (I73 00)   7  Shortness of breath (786 05) (R06 02)    Past Medical History   1  History of Acute medial meniscus tear of left knee (836 0) (S83 242A)   2   History of Fracture of proximal phalanx of lesser toe of left foot (826 0) (S92 512A)   3  History of Left elbow pain (719 42) (M25 522)   4  History of Left rib fracture (807 00) (S22 32XA)   5  History of Poison ivy dermatitis (692 6) (L23 7)  Active Problems And Past Medical History Reviewed: The active problems and past medical history were reviewed and updated today  Surgical History   1  History of Knee Arthroscopy With Medial Meniscectomy  Surgical History Reviewed: The surgical history was reviewed and updated today  Family History   Mother    1  Family history of cerebrovascular accident (CVA) (V17 1) (Z82 3)   2  Family history of essential hypertension (V17 49) (Z82 49)   3  Family history of hyperlipidemia (V18 19) (Z83 49)  Father    4  Family history of essential hypertension (V17 49) (Z82 49)   5  Family history of hyperlipidemia (V18 19) (Z83 49)  Family History Reviewed: The family history was reviewed and updated today  Social History    ·    · Denied: History of Drug use   · Never smoker   · Social alcohol use (Z78 9)  Social History Reviewed: The social history was reviewed and updated today  The social history was reviewed and is unchanged  Current Meds    1  Simvastatin 20 MG Oral Tablet; take one tablet at bedtime; Therapy: [id number] to (Last Otto Campbell)  Requested for: 58FDJ2909; Status: ACTIVE - Renewal     Denied Ordered  Medication List Reviewed: The medication list was reviewed and updated today  Allergies   1  No Known Drug Allergies    Vitals   Vital Signs    Recorded: 43Zih7380 08:53AM   Temperature 98 1 F, Oral   Heart Rate 74   Systolic 550, LUE, Sitting   Diastolic 78, LUE, Sitting   Height 5 ft 10 in   Weight 188 lb    BMI Calculated 26 98   BSA Calculated 2 03   O2 Saturation 98, RA     Physical Exam        Constitutional      General appearance: No acute distress, well appearing and well nourished         Eyes      Conjunctiva and lids: No swelling, erythema, or discharge  Pupils and irises: Equal, round and reactive to light  Ears, Nose, Mouth, and Throat      External inspection of ears and nose: Normal        Otoscopic examination: Abnormal  -- B/L cerumen impaction  Nasal mucosa, septum, and turbinates: Normal without edema or erythema  Oropharynx: Normal with no erythema, edema, exudate or lesions  -- MMM  Pulmonary      Respiratory effort: No increased work of breathing or signs of respiratory distress  Auscultation of lungs: Clear to auscultation, equal breath sounds bilaterally, no wheezes, no rales, no rhonci  -- no rhonchi or wheezing  Cardiovascular      Auscultation of heart: Normal rate and rhythm, normal S1 and S2, without murmurs  -- no pedal edema  Lymphatic      Palpation of lymph nodes in neck: No lymphadenopathy  Musculoskeletal      Gait and station: Normal        Skin      Skin and subcutaneous tissue: Normal without rashes or lesions  Neurologic no focal deficits  Psychiatric      Orientation to person, place and time: Normal        Mood and affect: Normal        Additional Exam:  Thyroid: no mass  no enlargement  Results/Data   (1) CBC/PLT/DIFF 29QJD2370 08:01AM Edwinna So   Test ordered by: Hank Gayle       Order Number: [id number]      Test Name Result Flag Reference   WBC COUNT 4 81 Thousand/uL  4 31-10 16   RBC COUNT 5 23 Million/uL  3 88-5 62   HEMOGLOBIN 16 2 g/dL  12 0-17 0   HEMATOCRIT 45 8 %  36 5-49 3   MCV 88 fL  82-98   MCH 31 0 pg  26 8-34 3   MCHC 35 4 g/dL  31 4-37 4   RDW 12 9 %  11 6-15 1   MPV 11 4 fL  8 9-12 7   PLATELET COUNT 712 Thousands/uL  149-390   nRBC AUTOMATED 0 /100 WBCs     NEUTROPHILS RELATIVE PERCENT 63 %  43-75   LYMPHOCYTES RELATIVE PERCENT 27 %  14-44   MONOCYTES RELATIVE PERCENT 8 %  4-12   EOSINOPHILS RELATIVE PERCENT 2 %  0-6   BASOPHILS RELATIVE PERCENT 0 %  0-1   NEUTROPHILS ABSOLUTE COUNT 2 99 Thousands/? ??L  1 85-7 62 LYMPHOCYTES ABSOLUTE COUNT 1 30 Thousands/? ??L  0 60-4 47   MONOCYTES ABSOLUTE COUNT 0 37 Thousand/? ??L  0 17-1 22   EOSINOPHILS ABSOLUTE COUNT 0 11 Thousand/? ??L  0 00-0 61   BASOPHILS ABSOLUTE COUNT 0 02 Thousands/? ??L  0 00-0 10      (1) LIPID PANEL, FASTING 39DOL4370 08:01AM Rachael Dad, [employer] [facility]   Test ordered by: Dianelys Chisholm      TW Order Number: [id number]      Test Name Result Flag Reference   CHOLESTEROL 135 mg/dL     HDL,DIRECT 32 mg/dL L 40-60   Specimen collection should occur prior to Metamizole administration due to the potential for falsley depressed results  LDL CHOLESTEROL CALCULATED 72 mg/dL  0-100   Triglyceride:           Normal <150 mg/dl      Borderline High 150-199 mg/dl      High 200-499 mg/dl      Very High >499 mg/dl               Cholesterol:          Desirable <200 mg/dl       Borderline High 200-239 mg/dl       High >239 mg/dl               HDL Cholesterol:          High>59 mg/dL       Low <41 mg/dL               This screening LDL is a calculated result  It does not have the accuracy of the Direct Measured LDL in the monitoring of patients with hyperlipidemia and/or statin therapy  Direct Measure LDL (NLX009) must be ordered separately in these patients  TRIGLYCERIDES 157 mg/dL H <=150   Specimen collection should occur prior to N-Acetylcysteine or Metamizole administration due to the potential for falsely depressed results        (1) COMPREHENSIVE METABOLIC PANEL 76WZY1524 04:42ZX Trinity Ojeda   Test ordered by: Dianelys Chisholm      TW Order Number: [id number]      Test Name Result Flag Reference   SODIUM 138 mmol/L  136-145   POTASSIUM 4 6 mmol/L  3 5-5 3   CHLORIDE 104 mmol/L  100-108   CARBON DIOXIDE 31 mmol/L  21-32   ANION GAP (CALC) 3 mmol/L L 4-13   BLOOD UREA NITROGEN 18 mg/dL  5-25   CREATININE 1 08 mg/dL  0 60-1 30   Standardized to IDMS reference method   CALCIUM 9 1 mg/dL  8 3-10 1   BILI, TOTAL 0 39 mg/dL  0 20-1 00   ALK PHOSPHATAS 86 U/L   ALT (SGPT) 42 U/L  12-78   Specimen collection should occur prior to Sulfasalazine and/or Sulfapyridine administration due to the potential for falsely depressed results  AST(SGOT) 21 U/L  5-45   Specimen collection should occur prior to Sulfasalazine administration due to the potential for falsely depressed results  ALBUMIN 4 1 g/dL  3 5-5 0   TOTAL PROTEIN 7 5 g/dL  6 4-8 2   eGFR 78 ml/min/1 73sq m     [facility] Disease Education Program recommendations are as follows:     GFR calculation is accurate only with a steady state creatinine     Chronic Kidney disease less than 60 ml/min/1 73 sq  meters     Kidney failure less than 15 ml/min/1 73 sq  meters  GLUCOSE FASTING 100 mg/dL H 65-99   Specimen collection should occur prior to Sulfasalazine administration due to the potential for falsely depressed results  Specimen collection should occur prior to Sulfapyridine administration due to the potential for falsely elevated results  (1) TSH WITH FT4 REFLEX 98HQF8053 08:01AM [facility]   Test ordered by: Malou Phipps Order Number: [id number]      Test Name Result Flag Reference   TSH 3 620 uIU/mL  0 358-3 740   Patients undergoing fluorescein dye angiography may retain small amounts of fluorescein in the body for 48-72 hours post procedure  Samples containing fluorescein can produce falsely depressed TSH values  If the patient had this procedure,a specimen should be resubmitted post fluorescein clearance  Signatures    Electronically signed by :  JESIKA Wei; Dec 28 2017  9:27AM EST                       (Author)     Electronically signed by : Maranda Mcclain DO; Dec 28 2017 12:32PM EST

## 2018-01-14 VITALS
SYSTOLIC BLOOD PRESSURE: 128 MMHG | HEIGHT: 70 IN | HEART RATE: 82 BPM | TEMPERATURE: 97.3 F | OXYGEN SATURATION: 98 % | DIASTOLIC BLOOD PRESSURE: 86 MMHG | WEIGHT: 178.25 LBS | BODY MASS INDEX: 25.52 KG/M2

## 2018-01-14 VITALS
OXYGEN SATURATION: 95 % | SYSTOLIC BLOOD PRESSURE: 124 MMHG | WEIGHT: 182.5 LBS | DIASTOLIC BLOOD PRESSURE: 78 MMHG | BODY MASS INDEX: 26.13 KG/M2 | HEIGHT: 70 IN | HEART RATE: 70 BPM | TEMPERATURE: 97.7 F

## 2018-01-22 VITALS
HEART RATE: 74 BPM | SYSTOLIC BLOOD PRESSURE: 104 MMHG | HEIGHT: 70 IN | DIASTOLIC BLOOD PRESSURE: 78 MMHG | OXYGEN SATURATION: 98 % | TEMPERATURE: 98.1 F | BODY MASS INDEX: 26.92 KG/M2 | WEIGHT: 188 LBS

## 2018-06-16 ENCOUNTER — APPOINTMENT (OUTPATIENT)
Dept: LAB | Facility: MEDICAL CENTER | Age: 53
End: 2018-06-16
Payer: COMMERCIAL

## 2018-06-16 ENCOUNTER — TRANSCRIBE ORDERS (OUTPATIENT)
Dept: ADMINISTRATIVE | Facility: HOSPITAL | Age: 53
End: 2018-06-16

## 2018-06-16 DIAGNOSIS — E78.00 PURE HYPERCHOLESTEROLEMIA: ICD-10-CM

## 2018-06-16 DIAGNOSIS — Z00.8 HEALTH EXAMINATION IN POPULATION SURVEYS: ICD-10-CM

## 2018-06-16 DIAGNOSIS — Z00.8 HEALTH EXAMINATION IN POPULATION SURVEYS: Primary | ICD-10-CM

## 2018-06-16 LAB
ALBUMIN SERPL BCP-MCNC: 4 G/DL (ref 3.5–5)
ALP SERPL-CCNC: 87 U/L (ref 46–116)
ALT SERPL W P-5'-P-CCNC: 36 U/L (ref 12–78)
ANION GAP SERPL CALCULATED.3IONS-SCNC: 6 MMOL/L (ref 4–13)
AST SERPL W P-5'-P-CCNC: 21 U/L (ref 5–45)
BASOPHILS # BLD AUTO: 0.04 THOUSANDS/ΜL (ref 0–0.1)
BASOPHILS NFR BLD AUTO: 1 % (ref 0–1)
BILIRUB SERPL-MCNC: 0.39 MG/DL (ref 0.2–1)
BUN SERPL-MCNC: 14 MG/DL (ref 5–25)
CALCIUM SERPL-MCNC: 8.8 MG/DL (ref 8.3–10.1)
CHLORIDE SERPL-SCNC: 105 MMOL/L (ref 100–108)
CHOLEST SERPL-MCNC: 140 MG/DL (ref 50–200)
CO2 SERPL-SCNC: 29 MMOL/L (ref 21–32)
CREAT SERPL-MCNC: 1.08 MG/DL (ref 0.6–1.3)
EOSINOPHIL # BLD AUTO: 0.09 THOUSAND/ΜL (ref 0–0.61)
EOSINOPHIL NFR BLD AUTO: 2 % (ref 0–6)
ERYTHROCYTE [DISTWIDTH] IN BLOOD BY AUTOMATED COUNT: 12.5 % (ref 11.6–15.1)
EST. AVERAGE GLUCOSE BLD GHB EST-MCNC: 117 MG/DL
GFR SERPL CREATININE-BSD FRML MDRD: 78 ML/MIN/1.73SQ M
GLUCOSE P FAST SERPL-MCNC: 90 MG/DL (ref 65–99)
HBA1C MFR BLD: 5.7 % (ref 4.2–6.3)
HCT VFR BLD AUTO: 48.3 % (ref 36.5–49.3)
HDLC SERPL-MCNC: 29 MG/DL (ref 40–60)
HGB BLD-MCNC: 15.7 G/DL (ref 12–17)
IMM GRANULOCYTES # BLD AUTO: 0.03 THOUSAND/UL (ref 0–0.2)
IMM GRANULOCYTES NFR BLD AUTO: 1 % (ref 0–2)
LDLC SERPL CALC-MCNC: 72 MG/DL (ref 0–100)
LYMPHOCYTES # BLD AUTO: 1.48 THOUSANDS/ΜL (ref 0.6–4.47)
LYMPHOCYTES NFR BLD AUTO: 30 % (ref 14–44)
MCH RBC QN AUTO: 29.6 PG (ref 26.8–34.3)
MCHC RBC AUTO-ENTMCNC: 32.5 G/DL (ref 31.4–37.4)
MCV RBC AUTO: 91 FL (ref 82–98)
MONOCYTES # BLD AUTO: 0.33 THOUSAND/ΜL (ref 0.17–1.22)
MONOCYTES NFR BLD AUTO: 7 % (ref 4–12)
NEUTROPHILS # BLD AUTO: 2.95 THOUSANDS/ΜL (ref 1.85–7.62)
NEUTS SEG NFR BLD AUTO: 59 % (ref 43–75)
NONHDLC SERPL-MCNC: 111 MG/DL
NRBC BLD AUTO-RTO: 0 /100 WBCS
PLATELET # BLD AUTO: 161 THOUSANDS/UL (ref 149–390)
PMV BLD AUTO: 11.3 FL (ref 8.9–12.7)
POTASSIUM SERPL-SCNC: 4.2 MMOL/L (ref 3.5–5.3)
PROT SERPL-MCNC: 7.2 G/DL (ref 6.4–8.2)
RBC # BLD AUTO: 5.3 MILLION/UL (ref 3.88–5.62)
SODIUM SERPL-SCNC: 140 MMOL/L (ref 136–145)
TRIGL SERPL-MCNC: 196 MG/DL
TSH SERPL DL<=0.05 MIU/L-ACNC: 3.69 UIU/ML (ref 0.36–3.74)
WBC # BLD AUTO: 4.92 THOUSAND/UL (ref 4.31–10.16)

## 2018-06-16 PROCEDURE — 84443 ASSAY THYROID STIM HORMONE: CPT

## 2018-06-16 PROCEDURE — 80053 COMPREHEN METABOLIC PANEL: CPT

## 2018-06-16 PROCEDURE — 83036 HEMOGLOBIN GLYCOSYLATED A1C: CPT | Performed by: PREVENTIVE MEDICINE

## 2018-06-16 PROCEDURE — 85025 COMPLETE CBC W/AUTO DIFF WBC: CPT

## 2018-06-16 PROCEDURE — 36415 COLL VENOUS BLD VENIPUNCTURE: CPT

## 2018-06-16 PROCEDURE — 80061 LIPID PANEL: CPT

## 2018-06-20 RX ORDER — SIMVASTATIN 20 MG
TABLET ORAL
Qty: 90 TABLET | Refills: 1 | OUTPATIENT
Start: 2018-06-20

## 2018-06-29 ENCOUNTER — OFFICE VISIT (OUTPATIENT)
Dept: INTERNAL MEDICINE CLINIC | Facility: CLINIC | Age: 53
End: 2018-06-29
Payer: COMMERCIAL

## 2018-06-29 VITALS
WEIGHT: 178 LBS | OXYGEN SATURATION: 98 % | TEMPERATURE: 98 F | HEIGHT: 68 IN | SYSTOLIC BLOOD PRESSURE: 110 MMHG | DIASTOLIC BLOOD PRESSURE: 70 MMHG | BODY MASS INDEX: 26.98 KG/M2 | HEART RATE: 74 BPM

## 2018-06-29 DIAGNOSIS — E78.00 HYPERCHOLESTEROLEMIA: Primary | ICD-10-CM

## 2018-06-29 PROBLEM — R06.02 SHORTNESS OF BREATH: Status: ACTIVE | Noted: 2017-06-05

## 2018-06-29 PROBLEM — E03.8 SUBCLINICAL HYPOTHYROIDISM: Status: ACTIVE | Noted: 2017-12-28

## 2018-06-29 PROCEDURE — 99213 OFFICE O/P EST LOW 20 MIN: CPT | Performed by: NURSE PRACTITIONER

## 2018-06-29 RX ORDER — SIMVASTATIN 20 MG
20 TABLET ORAL DAILY
Qty: 90 TABLET | Refills: 1 | Status: SHIPPED | OUTPATIENT
Start: 2018-06-29 | End: 2019-01-03 | Stop reason: SDUPTHER

## 2018-06-29 RX ORDER — SIMVASTATIN 20 MG
1 TABLET ORAL DAILY
COMMUNITY
Start: 2017-06-28 | End: 2018-06-29 | Stop reason: SDUPTHER

## 2018-06-29 RX ORDER — OMEGA-3 FATTY ACIDS CAP DELAYED RELEASE 1000 MG 1000 MG
CAPSULE DELAYED RELEASE ORAL DAILY
Refills: 0
Start: 2018-06-29

## 2018-06-29 NOTE — PROGRESS NOTES
Assessment/Plan:    Hyperlipidemia:  Your TG have increased since your last labs  This is your fats, sweets, sugars, nuts and oils  Would recommend adding fish oil to your diet 1000mg daily  Continue simvastatin 20mg daily  Recommend healthy lifestyle choices for your cholesterol  Low fat/low cholesterol diet  Limit/avoid red meat  Eat more lean meat - chicken breast, ground turkey, fish  Exercise 30 mins at least 3 times a week as tolerated  Subclinical Hypothyroid:  Stable  TSH WNL    Follow-up in 6 months, sooner if needed  Repeat lipids in 6 months  Diagnoses and all orders for this visit:    Hypercholesterolemia  -     simvastatin (ZOCOR) 20 mg tablet; Take 1 tablet (20 mg total) by mouth daily  -     Comprehensive metabolic panel; Future  -     Lipid panel; Future  -     Omega-3 Fatty Acids (FISH OIL) 1000 MG CPDR; Take by mouth daily    Other orders  -     Discontinue: simvastatin (ZOCOR) 20 mg tablet; Take 1 tablet by mouth daily        Subjective:      Patient ID: Lawrence Mccoy is a 47 y o  male  HPI    Patient presents for six month follow-up  He is doing well with no concerns    Hyperlipidemia:  Pt is here for follow-up hyperlipidemia  Pt is doing well with no concerns  Pt is currently taking simvastatin  Tolerating well  Side effects of medication include none  Pt diet consists of overall healthy  Primarily salads for lunch  Primarily chicken and fish, fruits and vegetables  Limited red meat  The pt does exercise on a routine basis - minh and jacquelin galvez     Last lipid panel was 06/2018  TG have increased since last labs  Subclinical Hypothyroid:  Asymptomatic    TSH WNL 06/2018    The following portions of the patient's history were reviewed and updated as appropriate: allergies, current medications, past family history, past medical history, past social history, past surgical history and problem list     Review of Systems   Constitutional: Negative for chills, fatigue and fever  HENT: Negative for congestion, postnasal drip and sinus pressure  Eyes: Negative for visual disturbance  Respiratory: Negative for chest tightness, shortness of breath and wheezing  Cardiovascular: Negative for chest pain, palpitations and leg swelling  Gastrointestinal: Negative for constipation, diarrhea, nausea and vomiting  Genitourinary: Negative for dysuria, frequency and urgency  Musculoskeletal: Negative for back pain  Skin: Negative for rash  Neurological: Negative for dizziness, syncope, light-headedness and headaches  Psychiatric/Behavioral: Negative for dysphoric mood and sleep disturbance  The patient is not nervous/anxious  Past Medical History:   Diagnosis Date    Renal disorder          Current Outpatient Prescriptions:     simvastatin (ZOCOR) 20 mg tablet, Take 1 tablet by mouth daily, Disp: , Rfl:     No Known Allergies    Social History   Past Surgical History:   Procedure Laterality Date    KNEE ARTHROSCOPY Left 06/06/2005    w/Medial Meniscectomy    MD CYSTO/URETERO/PYELOSCOPY W/LITHOTRIPSY Left 2/9/2017    Procedure: HOLMIUM LASER, URETEROSOCPY ;  Surgeon: Epi Avila MD;  [facility];  Service: Urology    MD CYSTOURETHROSCOPY,URETER CATHETER Left 2/9/2017    Procedure: CYSTOSCOPY RETROGRADE PYELOGRAM WITH STENT INSERTION;  Surgeon: Epi Avila MD;  [facility];  Service: Urology     Family History   Problem Relation Age of Onset    Stroke Mother         CVA    Hypertension Mother         Essential    Hyperlipidemia Mother     Hypertension Father         Essential    Hyperlipidemia Father        Objective:  /70 (BP Location: Left arm, Patient Position: Sitting, Cuff Size: Standard)   Pulse 74   Temp 98 °F (36 7 °C) (Oral)   Ht 5' 7 64" (1 718 m)   Wt 80 7 kg (178 lb)   SpO2 98%   BMI 27 36 kg/m²      Physical Exam   Constitutional: He is oriented to person, place, and time   He appears well-developed and well-nourished  No distress  Neck: Neck supple  No thyromegaly present  Cardiovascular: Normal rate and regular rhythm  No murmur heard  No pedal edema   Pulmonary/Chest: Effort normal and breath sounds normal  No respiratory distress  He has no wheezes  Neurological: He is alert and oriented to person, place, and time  No focal deficits   Skin: Skin is warm and dry  No rash noted  Psychiatric: He has a normal mood and affect  His behavior is normal  Judgment and thought content normal    Nursing note and vitals reviewed

## 2018-06-29 NOTE — PATIENT INSTRUCTIONS
Hyperlipidemia:  Your TG have increased since your last labs  This is your fats, sweets, sugars, nuts and oils  Would recommend adding fish oil to your diet 1000mg daily  Continue simvastatin 20mg daily  Recommend healthy lifestyle choices for your cholesterol  Low fat/low cholesterol diet  Limit/avoid red meat  Eat more lean meat - chicken breast, ground turkey, fish  Exercise 30 mins at least 3 times a week as tolerated  Subclinical Hypothyroid:  Stable  TSH WNL    Follow-up in 6 months, sooner if needed  Repeat lipids in 6 months

## 2018-07-17 DIAGNOSIS — E78.00 HYPERCHOLESTEROLEMIA: ICD-10-CM

## 2018-07-18 RX ORDER — SIMVASTATIN 20 MG
20 TABLET ORAL DAILY
Qty: 90 TABLET | Refills: 0 | OUTPATIENT
Start: 2018-07-18

## 2018-09-13 ENCOUNTER — APPOINTMENT (OUTPATIENT)
Dept: LAB | Age: 53
End: 2018-09-13
Payer: COMMERCIAL

## 2018-09-13 ENCOUNTER — TRANSCRIBE ORDERS (OUTPATIENT)
Dept: LAB | Age: 53
End: 2018-09-13

## 2018-09-13 DIAGNOSIS — N40.1 BENIGN PROSTATIC HYPERPLASIA WITH LOWER URINARY TRACT SYMPTOMS, SYMPTOM DETAILS UNSPECIFIED: ICD-10-CM

## 2018-09-13 DIAGNOSIS — N40.1 BENIGN PROSTATIC HYPERPLASIA WITH LOWER URINARY TRACT SYMPTOMS, SYMPTOM DETAILS UNSPECIFIED: Primary | ICD-10-CM

## 2018-09-13 LAB — PSA SERPL-MCNC: 0.6 NG/ML (ref 0–4)

## 2018-09-13 PROCEDURE — 84153 ASSAY OF PSA TOTAL: CPT

## 2018-10-02 ENCOUNTER — OFFICE VISIT (OUTPATIENT)
Dept: UROLOGY | Facility: CLINIC | Age: 53
End: 2018-10-02
Payer: COMMERCIAL

## 2018-10-02 VITALS
SYSTOLIC BLOOD PRESSURE: 112 MMHG | DIASTOLIC BLOOD PRESSURE: 72 MMHG | HEART RATE: 72 BPM | WEIGHT: 181 LBS | BODY MASS INDEX: 27.43 KG/M2 | HEIGHT: 68 IN

## 2018-10-02 DIAGNOSIS — N40.1 BENIGN PROSTATIC HYPERPLASIA WITH LOWER URINARY TRACT SYMPTOMS, SYMPTOM DETAILS UNSPECIFIED: Primary | ICD-10-CM

## 2018-10-02 DIAGNOSIS — N20.0 CALCULUS OF KIDNEY: ICD-10-CM

## 2018-10-02 LAB
SL AMB  POCT GLUCOSE, UA: NORMAL
SL AMB LEUKOCYTE ESTERASE,UA: NORMAL
SL AMB POCT BILIRUBIN,UA: NORMAL
SL AMB POCT BLOOD,UA: NORMAL
SL AMB POCT CLARITY,UA: CLEAR
SL AMB POCT COLOR,UA: YELLOW
SL AMB POCT KETONES,UA: NORMAL
SL AMB POCT NITRITE,UA: NORMAL
SL AMB POCT PH,UA: 6
SL AMB POCT SPECIFIC GRAVITY,UA: 1.01
SL AMB POCT URINE PROTEIN: NORMAL
SL AMB POCT UROBILINOGEN: 1

## 2018-10-02 PROCEDURE — 99213 OFFICE O/P EST LOW 20 MIN: CPT | Performed by: PHYSICIAN ASSISTANT

## 2018-10-02 PROCEDURE — 81002 URINALYSIS NONAUTO W/O SCOPE: CPT | Performed by: PHYSICIAN ASSISTANT

## 2018-10-02 NOTE — PROGRESS NOTES
UROLOGY PROGRESS NOTE   Patient Identifiers: Hernán Montalvo (MRN [id number])  Date of Service: 10/2/2018    Subjective:     41-year-old male with history of BPH and kidney stones  He had a ureteral stone last year  PSA is 0 6  Urine is clear  AUA index score is 8  No other complaints  Patient has  no complaints  Objective:     VITALS:    Vitals:    10/02/18 1343   BP: 112/72   Pulse: 72     AUA SYMPTOM SCORE      Most Recent Value   AUA SYMPTOM SCORE   How often have you had a sensation of not emptying your bladder completely after you finished urinating? 1   How often have you had to urinate again less than two hours after you finished urinating? 2   How often have you found you stopped and started again several times when you urinate? 2   How often have you found it difficult to postpone urination? 1   How often have you had a weak urinary stream?  0   How often have you had to push or strain to begin urination? 0   How many times did you most typically get up to urinate from the time you went to bed at night until the time you got up in the morning?   2   Quality of Life: If you were to spend the rest of your life with your urinary condition just the way it is now, how would you feel about that?  2   AUA SYMPTOM SCORE  8            LABS:  Lab Results   Component Value Date    HGB 15 7 06/16/2018    HCT 48 3 06/16/2018    WBC 4 92 06/16/2018     06/16/2018   ]    Lab Results   Component Value Date     06/16/2018    K 4 2 06/16/2018     06/16/2018    CO2 29 06/16/2018    BUN 14 06/16/2018    CREATININE 1 08 06/16/2018    CALCIUM 8 8 06/16/2018   ]        INPATIENT MEDS:    Current Outpatient Prescriptions:     Omega-3 Fatty Acids (FISH OIL) 1000 MG CPDR, Take by mouth daily, Disp: , Rfl: 0    simvastatin (ZOCOR) 20 mg tablet, Take 1 tablet (20 mg total) by mouth daily, Disp: 90 tablet, Rfl: 1      Physical Exam:   /72 (BP Location: Left arm, Patient Position: Sitting, Cuff Size: Adult)   Pulse 72   Ht 5' 7 64" (1 718 m)   Wt 82 1 kg (181 lb)   BMI 27 81 kg/m²   GEN: no acute distress    RESP: breathing comfortably with no accessory muscle use    ABD: soft, non-tender, non-distended   INCISION:    EXT: no significant peripheral edema   (Male): Penis circumcised, phallus normal, meatus patent  Testicles descended into scrotum bilaterally without masses nor tenderness  No inguinal hernias bilaterally  SEAN: Prostate is enlarged at 35 grams  The prostate is not boggy  The prostate is not tender  No nodules noted      RADIOLOGY:     None     Assessment:    1   BPH     Plan:   - follow-up 1 year with PSA prior to visit  -  -  -

## 2018-11-23 ENCOUNTER — APPOINTMENT (OUTPATIENT)
Dept: LAB | Facility: OTHER | Age: 53
End: 2018-11-23
Payer: COMMERCIAL

## 2018-11-23 ENCOUNTER — TRANSCRIBE ORDERS (OUTPATIENT)
Dept: LAB | Facility: OTHER | Age: 53
End: 2018-11-23

## 2018-11-23 DIAGNOSIS — E78.00 HYPERCHOLESTEROLEMIA: ICD-10-CM

## 2018-11-23 LAB
ALBUMIN SERPL BCP-MCNC: 4.1 G/DL (ref 3.5–5)
ALP SERPL-CCNC: 94 U/L (ref 46–116)
ALT SERPL W P-5'-P-CCNC: 32 U/L (ref 12–78)
ANION GAP SERPL CALCULATED.3IONS-SCNC: 2 MMOL/L (ref 4–13)
AST SERPL W P-5'-P-CCNC: 21 U/L (ref 5–45)
BILIRUB SERPL-MCNC: 0.44 MG/DL (ref 0.2–1)
BUN SERPL-MCNC: 15 MG/DL (ref 5–25)
CALCIUM SERPL-MCNC: 9.8 MG/DL (ref 8.3–10.1)
CHLORIDE SERPL-SCNC: 103 MMOL/L (ref 100–108)
CHOLEST SERPL-MCNC: 160 MG/DL (ref 50–200)
CO2 SERPL-SCNC: 32 MMOL/L (ref 21–32)
CREAT SERPL-MCNC: 1.14 MG/DL (ref 0.6–1.3)
GFR SERPL CREATININE-BSD FRML MDRD: 73 ML/MIN/1.73SQ M
GLUCOSE P FAST SERPL-MCNC: 107 MG/DL (ref 65–99)
HDLC SERPL-MCNC: 31 MG/DL (ref 40–60)
LDLC SERPL CALC-MCNC: 80 MG/DL (ref 0–100)
NONHDLC SERPL-MCNC: 129 MG/DL
POTASSIUM SERPL-SCNC: 4.2 MMOL/L (ref 3.5–5.3)
PROT SERPL-MCNC: 7.7 G/DL (ref 6.4–8.2)
SODIUM SERPL-SCNC: 137 MMOL/L (ref 136–145)
TRIGL SERPL-MCNC: 244 MG/DL

## 2018-11-23 PROCEDURE — 80053 COMPREHEN METABOLIC PANEL: CPT

## 2018-11-23 PROCEDURE — 36415 COLL VENOUS BLD VENIPUNCTURE: CPT

## 2018-11-23 PROCEDURE — 80061 LIPID PANEL: CPT

## 2018-12-07 ENCOUNTER — OFFICE VISIT (OUTPATIENT)
Dept: INTERNAL MEDICINE CLINIC | Age: 53
End: 2018-12-07
Payer: COMMERCIAL

## 2018-12-07 VITALS
TEMPERATURE: 97.7 F | SYSTOLIC BLOOD PRESSURE: 110 MMHG | BODY MASS INDEX: 27.05 KG/M2 | HEIGHT: 68 IN | OXYGEN SATURATION: 98 % | WEIGHT: 178.5 LBS | DIASTOLIC BLOOD PRESSURE: 72 MMHG | HEART RATE: 76 BPM

## 2018-12-07 DIAGNOSIS — E78.00 HYPERCHOLESTEROLEMIA: Primary | ICD-10-CM

## 2018-12-07 DIAGNOSIS — E03.8 SUBCLINICAL HYPOTHYROIDISM: ICD-10-CM

## 2018-12-07 DIAGNOSIS — Z12.11 SCREENING FOR COLON CANCER: ICD-10-CM

## 2018-12-07 DIAGNOSIS — Z11.59 NEED FOR HEPATITIS C SCREENING TEST: ICD-10-CM

## 2018-12-07 PROCEDURE — 99213 OFFICE O/P EST LOW 20 MIN: CPT | Performed by: NURSE PRACTITIONER

## 2018-12-07 NOTE — PROGRESS NOTES
Assessment/Plan:    HLD  TG increased again  Pt was not taking fish oil consistently  He will re-start and increase to BID  Work on diet and exercise  D/w pt 30 mins of cardio 3 x a week  D/w pt starting fibrate now, however he wants to try fish oil/diet first     If no improvement in 6 months will start fenofibrate     Subclinical Hypothyroid  Stable  TSH WNL in 06/2018    Will screen for Hep C  Reviewed most recent labs    Pt to follow-up in 6 months, sooner if needed  Diagnoses and all orders for this visit:    Hypercholesterolemia  -     Lipid panel; Future    Screening for colon cancer  -     Ambulatory referral to Gastroenterology; Future    Subclinical hypothyroidism    Need for hepatitis C screening test  -     Hepatitis C antibody; Future        Subjective:      Patient ID: Maddy Nagel is a 46 y o  male  HPI    Hyperlipidemia:  Pt is here for follow-up hyperlipidemia  Pt is doing well with no concerns  Pt is currently taking simvastatin  He was started on fish oil approx 6 months ago, however he was not taking consistently  He restarted and is now taking daily  Tolerating well  Side effects of medication include none  Pt diet consists of room for improvement - pt has been snacking more - more carbs/sweets  Primarily chicken and fish, fruits and vegetables  Limited red meat  The pt does not exercise on a routine basis  Last lipid panel was 11/2018  TG have increased since last labs again     Subclinical Hypothyroid:  Asymptomatic  TSH WNL  06/2018  The following portions of the patient's history were reviewed and updated as appropriate: allergies, current medications, past family history, past medical history, past social history, past surgical history and problem list     Review of Systems   Constitutional: Negative for appetite change, chills, fatigue, fever and unexpected weight change  HENT: Negative for congestion, postnasal drip and sinus pressure      Eyes: Negative for visual disturbance  Respiratory: Negative for chest tightness, shortness of breath and wheezing  Cardiovascular: Negative for chest pain, palpitations and leg swelling  Gastrointestinal: Negative for abdominal pain, constipation, diarrhea, nausea and vomiting  Musculoskeletal: Negative for myalgias  Neurological: Negative for dizziness, syncope, weakness, light-headedness and headaches  Psychiatric/Behavioral: Negative for dysphoric mood and sleep disturbance  The patient is not nervous/anxious  Past Medical History:   Diagnosis Date    Renal disorder        Current Outpatient Prescriptions:     Omega-3 Fatty Acids (FISH OIL) 1000 MG CPDR, Take by mouth daily, Disp: , Rfl: 0    simvastatin (ZOCOR) 20 mg tablet, Take 1 tablet (20 mg total) by mouth daily, Disp: 90 tablet, Rfl: 1    No Known Allergies    Social History   Past Surgical History:   Procedure Laterality Date    KNEE ARTHROSCOPY Left 06/06/2005    w/Medial Meniscectomy    ID CYSTO/URETERO/PYELOSCOPY W/LITHOTRIPSY Left 2/9/2017    Procedure: HOLMIUM LASER, URETEROSOCPY ;  Surgeon: Alexandre Valdovinos MD;  [facility];  Service: Urology    ID CYSTOURETHROSCOPY,URETER CATHETER Left 2/9/2017    Procedure: CYSTOSCOPY RETROGRADE PYELOGRAM WITH STENT INSERTION;  Surgeon: Alexandre Valdovinos MD;  [facility];  Service: Urology     Family History   Problem Relation Age of Onset    Stroke Mother         CVA    Hypertension Mother         Essential    Hyperlipidemia Mother     Hypertension Father         Essential    Hyperlipidemia Father        Objective:  /72 (BP Location: Left arm, Patient Position: Sitting, Cuff Size: Adult)   Pulse 76   Temp 97 7 °F (36 5 °C) (Tympanic)   Ht 5' 7 72" (1 72 m)   Wt 81 kg (178 lb 8 oz)   SpO2 98% Comment: room air  BMI 27 37 kg/m²      Physical Exam   Constitutional: He is oriented to person, place, and time  He appears well-developed and well-nourished  No distress     Neck: Neck supple  No thyromegaly present  Cardiovascular: Normal rate and regular rhythm  No murmur heard  No pedal edema   Pulmonary/Chest: Effort normal and breath sounds normal  No respiratory distress  He has no wheezes  Neurological: He is alert and oriented to person, place, and time  No focal deficits   Skin: Skin is warm and dry  No rash noted  Psychiatric: He has a normal mood and affect  His behavior is normal  Judgment and thought content normal    Nursing note and vitals reviewed

## 2018-12-21 DIAGNOSIS — E78.00 HYPERCHOLESTEROLEMIA: ICD-10-CM

## 2018-12-21 RX ORDER — SIMVASTATIN 20 MG
TABLET ORAL
Qty: 90 TABLET | Refills: 1 | OUTPATIENT
Start: 2018-12-21

## 2019-01-03 DIAGNOSIS — E78.00 HYPERCHOLESTEROLEMIA: ICD-10-CM

## 2019-01-03 RX ORDER — SIMVASTATIN 20 MG
20 TABLET ORAL DAILY
Qty: 90 TABLET | Refills: 1 | Status: SHIPPED | OUTPATIENT
Start: 2019-01-03 | End: 2019-06-28 | Stop reason: SDUPTHER

## 2019-01-28 ENCOUNTER — TELEPHONE (OUTPATIENT)
Dept: NEPHROLOGY | Facility: HOSPITAL | Age: 54
End: 2019-01-28

## 2019-01-28 ENCOUNTER — APPOINTMENT (EMERGENCY)
Dept: RADIOLOGY | Facility: HOSPITAL | Age: 54
End: 2019-01-28
Payer: COMMERCIAL

## 2019-01-28 ENCOUNTER — HOSPITAL ENCOUNTER (EMERGENCY)
Facility: HOSPITAL | Age: 54
Discharge: HOME | End: 2019-01-28
Attending: EMERGENCY MEDICINE | Admitting: EMERGENCY MEDICINE
Payer: COMMERCIAL

## 2019-01-28 VITALS
HEART RATE: 61 BPM | BODY MASS INDEX: 26.83 KG/M2 | DIASTOLIC BLOOD PRESSURE: 55 MMHG | SYSTOLIC BLOOD PRESSURE: 115 MMHG | OXYGEN SATURATION: 98 % | WEIGHT: 175 LBS | RESPIRATION RATE: 16 BRPM | TEMPERATURE: 97.3 F

## 2019-01-28 DIAGNOSIS — N20.0 RIGHT KIDNEY STONE: Primary | ICD-10-CM

## 2019-01-28 LAB
ALBUMIN SERPL BCP-MCNC: 3.9 G/DL (ref 3.5–5)
ALP SERPL-CCNC: 79 U/L (ref 46–116)
ALT SERPL W P-5'-P-CCNC: 37 U/L (ref 12–78)
ANION GAP SERPL CALCULATED.3IONS-SCNC: 3 MMOL/L (ref 4–13)
AST SERPL W P-5'-P-CCNC: 21 U/L (ref 5–45)
BACTERIA UR QL AUTO: ABNORMAL /HPF
BASOPHILS # BLD AUTO: 0.03 THOUSANDS/ΜL (ref 0–0.1)
BASOPHILS NFR BLD AUTO: 1 % (ref 0–1)
BILIRUB SERPL-MCNC: 0.48 MG/DL (ref 0.2–1)
BILIRUB UR QL STRIP: ABNORMAL
BUN SERPL-MCNC: 18 MG/DL (ref 5–25)
CALCIUM SERPL-MCNC: 8.7 MG/DL (ref 8.3–10.1)
CHLORIDE SERPL-SCNC: 107 MMOL/L (ref 100–108)
CLARITY UR: ABNORMAL
CO2 SERPL-SCNC: 29 MMOL/L (ref 21–32)
COLOR UR: ABNORMAL
COLOR, POC: NORMAL
CREAT SERPL-MCNC: 1.1 MG/DL (ref 0.6–1.3)
EOSINOPHIL # BLD AUTO: 0.09 THOUSAND/ΜL (ref 0–0.61)
EOSINOPHIL NFR BLD AUTO: 2 % (ref 0–6)
ERYTHROCYTE [DISTWIDTH] IN BLOOD BY AUTOMATED COUNT: 12.2 % (ref 11.6–15.1)
GFR SERPL CREATININE-BSD FRML MDRD: 76 ML/MIN/1.73SQ M
GLUCOSE SERPL-MCNC: 105 MG/DL (ref 65–140)
GLUCOSE UR STRIP-MCNC: NEGATIVE MG/DL
HCT VFR BLD AUTO: 46.9 % (ref 36.5–49.3)
HGB BLD-MCNC: 15.9 G/DL (ref 12–17)
HGB UR QL STRIP.AUTO: ABNORMAL
IMM GRANULOCYTES # BLD AUTO: 0.01 THOUSAND/UL (ref 0–0.2)
IMM GRANULOCYTES NFR BLD AUTO: 0 % (ref 0–2)
KETONES UR STRIP-MCNC: NEGATIVE MG/DL
LEUKOCYTE ESTERASE UR QL STRIP: NEGATIVE
LYMPHOCYTES # BLD AUTO: 1.05 THOUSANDS/ΜL (ref 0.6–4.47)
LYMPHOCYTES NFR BLD AUTO: 28 % (ref 14–44)
MCH RBC QN AUTO: 30.3 PG (ref 26.8–34.3)
MCHC RBC AUTO-ENTMCNC: 33.9 G/DL (ref 31.4–37.4)
MCV RBC AUTO: 90 FL (ref 82–98)
MONOCYTES # BLD AUTO: 0.32 THOUSAND/ΜL (ref 0.17–1.22)
MONOCYTES NFR BLD AUTO: 8 % (ref 4–12)
MUCOUS THREADS UR QL AUTO: ABNORMAL
NEUTROPHILS # BLD AUTO: 2.3 THOUSANDS/ΜL (ref 1.85–7.62)
NEUTS SEG NFR BLD AUTO: 61 % (ref 43–75)
NITRITE UR QL STRIP: NEGATIVE
NON-SQ EPI CELLS URNS QL MICRO: ABNORMAL /HPF
NRBC BLD AUTO-RTO: 0 /100 WBCS
OTHER STN SPEC: ABNORMAL
PH UR STRIP.AUTO: 5.5 [PH] (ref 4.5–8)
PLATELET # BLD AUTO: 144 THOUSANDS/UL (ref 149–390)
PMV BLD AUTO: 10.7 FL (ref 8.9–12.7)
POTASSIUM SERPL-SCNC: 4.2 MMOL/L (ref 3.5–5.3)
PROT SERPL-MCNC: 7.3 G/DL (ref 6.4–8.2)
PROT UR STRIP-MCNC: ABNORMAL MG/DL
RBC # BLD AUTO: 5.24 MILLION/UL (ref 3.88–5.62)
RBC #/AREA URNS AUTO: ABNORMAL /HPF
SODIUM SERPL-SCNC: 139 MMOL/L (ref 136–145)
SP GR UR STRIP.AUTO: >=1.03 (ref 1–1.03)
UROBILINOGEN UR QL STRIP.AUTO: 0.2 E.U./DL
WBC # BLD AUTO: 3.8 THOUSAND/UL (ref 4.31–10.16)
WBC #/AREA URNS AUTO: ABNORMAL /HPF

## 2019-01-28 PROCEDURE — 96374 THER/PROPH/DIAG INJ IV PUSH: CPT

## 2019-01-28 PROCEDURE — 80053 COMPREHEN METABOLIC PANEL: CPT | Performed by: EMERGENCY MEDICINE

## 2019-01-28 PROCEDURE — 74176 CT ABD & PELVIS W/O CONTRAST: CPT

## 2019-01-28 PROCEDURE — 81001 URINALYSIS AUTO W/SCOPE: CPT

## 2019-01-28 PROCEDURE — 85025 COMPLETE CBC W/AUTO DIFF WBC: CPT | Performed by: EMERGENCY MEDICINE

## 2019-01-28 PROCEDURE — 36415 COLL VENOUS BLD VENIPUNCTURE: CPT | Performed by: EMERGENCY MEDICINE

## 2019-01-28 PROCEDURE — 99244 OFF/OP CNSLTJ NEW/EST MOD 40: CPT | Performed by: NURSE PRACTITIONER

## 2019-01-28 PROCEDURE — 99284 EMERGENCY DEPT VISIT MOD MDM: CPT

## 2019-01-28 RX ORDER — NAPROXEN 500 MG/1
500 TABLET ORAL 2 TIMES DAILY WITH MEALS
Qty: 30 TABLET | Refills: 0 | Status: SHIPPED | OUTPATIENT
Start: 2019-01-28 | End: 2019-06-28

## 2019-01-28 RX ORDER — TAMSULOSIN HYDROCHLORIDE 0.4 MG/1
0.4 CAPSULE ORAL
Qty: 14 CAPSULE | Refills: 0 | Status: ON HOLD | OUTPATIENT
Start: 2019-01-28 | End: 2019-02-14

## 2019-01-28 RX ORDER — KETOROLAC TROMETHAMINE 30 MG/ML
15 INJECTION, SOLUTION INTRAMUSCULAR; INTRAVENOUS ONCE
Status: COMPLETED | OUTPATIENT
Start: 2019-01-28 | End: 2019-01-28

## 2019-01-28 RX ADMIN — KETOROLAC TROMETHAMINE 15 MG: 30 INJECTION, SOLUTION INTRAMUSCULAR at 06:21

## 2019-01-28 NOTE — ED PROVIDER NOTES
ASSESSMENT AND PLAN    Aliyah Merino is a 46 y o  male with a history of prior obstructing kidney stone requiring stent placement, who presents with right flank pain since Saturday, worsening in nature, similar to his prior kidney stone  On arrival, the patient is hemodynamically stable, well-appearing, and without acute distress, though of note he did take an oxycodone prior to arrival   He states that his pain is mild  His physical exam is largely unremarkable   -will add a single dose of Toradol for pain  -bedside ultrasound negative for hydronephrosis bilaterally, with a normal bladder  -lab work unremarkable  Creatinine is stable  -urinalysis appears contaminated  Culture sent and pending  -CT stone study did display 3 mm partially obstructing stone  -will consult Urology, and re-evaluate for discharge pending their recommendations  ED Course as of Jan 29 0349 Mon Jan 28, 2019   7814 Patient re-evaluated at bedside  Currently feels well, is pain-free after receiving IV Toradol   -lab work and Imaging reviewed  CT stone study significant for a 3 mm stone in the proximal ureter, without significant hydronephrosis  The patient was updated of this result   -urology paged, currently awaiting call back  Disposition pending Urology recommendations   -Patient signed out to Dr Edd Noriega   Patient presents with    Flank Pain     patient c/o RUQ and right flank pain that began saturday and progressively gotten worse  Denies fever, n/v, blood in urine at this time  hx of kidney stones     This is a 70-year-old male who presents for evaluation of right flank pain  The patient has a history of kidney stones, and did have an obstructing nephrolithiasis approximately a year ago, which required stent placement at that time  He has history of hyperlipidemia, hypertension    The patient presents with right flank pain, which started on Saturday, and has been progressively worsening  He states that he was attempting to manage the pain with over-the-counter medications, however immediately prior to arrival, the pain became too severe, so he took an oxycodone that he had left over from his last kidney stone, and came to the emergency department for further evaluation  The patient states that his pain is right-sided, waxing and waning in nature but constant, sharp, and feels exactly similar to his prior kidney stone  He denies any fevers, chills, chest pain, shortness of breath, nausea, vomiting, diarrhea, or urinary symptoms including dysuria, hematuria, urinary urgency/frequency  He has no recent travel, recent surgeries, recent sick contacts  He has no history of abdominal surgeries in the past             Prior to Admission Medications   Prescriptions Last Dose Informant Patient Reported? Taking?    Omega-3 Fatty Acids (FISH OIL) 1000 MG CPDR 1/27/2019 at Unknown time Self No Yes   Sig: Take by mouth daily   simvastatin (ZOCOR) 20 mg tablet 1/27/2019 at Unknown time  No Yes   Sig: Take 1 tablet (20 mg total) by mouth daily      Facility-Administered Medications: None       Past Medical History:   Diagnosis Date    Renal disorder        Past Surgical History:   Procedure Laterality Date    KNEE ARTHROSCOPY Left 06/06/2005    w/Medial Meniscectomy    [place] CYSTO/URETERO/PYELOSCOPY W/LITHOTRIPSY Left 2/9/2017    Procedure: HOLMIUM Weyman Cortes ;  Surgeon: Karlee Arbams MD;  [facility];  Service: Urology    [place] CYSTOURETHROSCOPY,URETER CATHETER Left 2/9/2017    Procedure: CYSTOSCOPY RETROGRADE PYELOGRAM WITH STENT INSERTION;  Surgeon: Karlee Abrams MD;  [facility];  Service: Urology       Family History   Problem Relation Age of Onset    Stroke Mother         CVA    Hypertension Mother         Essential    Hyperlipidemia Mother     Hypertension Father         Essential    Hyperlipidemia Father      I have reviewed and agree with the history as documented  Social History   Substance Use Topics    Smoking status: Never Smoker    Smokeless tobacco: Never Used    Alcohol use Yes      Comment: Social        Review of Systems   Constitutional: Negative for chills and fever  HENT: Negative for congestion and sinus pain  Eyes: Negative for photophobia and visual disturbance  Respiratory: Negative for cough and shortness of breath  Cardiovascular: Negative for chest pain and palpitations  Gastrointestinal: Negative for abdominal pain, diarrhea, nausea and vomiting  Genitourinary: Positive for flank pain  Negative for decreased urine volume, discharge, dysuria and hematuria  Musculoskeletal: Negative for neck pain and neck stiffness  Skin: Negative for pallor and rash  Neurological: Negative for light-headedness and headaches  Physical Exam  ED Triage Vitals   Temperature Pulse Respirations Blood Pressure SpO2   01/28/19 0551 01/28/19 0551 01/28/19 0551 01/28/19 0551 01/28/19 0551   (!) 97 3 °F (36 3 °C) 68 20 133/82 95 %      Temp Source Heart Rate Source Patient Position - Orthostatic VS BP Location FiO2 (%)   01/28/19 0551 01/28/19 0551 01/28/19 0659 01/28/19 0659 --   Oral Monitor Lying Right arm       Pain Score       01/28/19 0551       5           Orthostatic Vital Signs  Vitals:    01/28/19 0659 01/28/19 0908 01/28/19 1000 01/28/19 1100   BP: 108/56 98/55 109/58 115/55   Pulse: 66 68 69 61   Patient Position - Orthostatic VS: Lying          Physical Exam   Constitutional: He is oriented to person, place, and time  Resting comfortably on stretcher  Awake alert, well-appearing, no acute distress  Nontoxic in appearance  Appears stated age   HENT:   Head: Normocephalic and atraumatic  Mouth/Throat: Oropharynx is clear and moist  No oropharyngeal exudate  Eyes: Pupils are equal, round, and reactive to light  No scleral icterus  Neck: Normal range of motion  No JVD present     Cardiovascular: Normal rate, regular rhythm and normal heart sounds  No murmur heard  Pulmonary/Chest: Effort normal  No respiratory distress  He has no wheezes  He has no rales  Abdominal: Soft  He exhibits no distension  There is no tenderness  Musculoskeletal: Normal range of motion  He exhibits no edema  Neurological: He is alert and oriented to person, place, and time  He exhibits normal muscle tone  Skin: Skin is warm and dry  No rash noted  No pallor  ED Medications  Medications   ketorolac (TORADOL) injection 15 mg (15 mg Intravenous Given 1/28/19 0621)       Diagnostic Studies  Results Reviewed     Procedure Component Value Units Date/Time    Urine Microscopic [[id number]]  (Abnormal) Collected:  01/28/19 0615    Lab Status:  Final result Specimen:  Urine from Urine, Clean Catch Updated:  01/28/19 0815     RBC, UA 30-50 (A) /hpf      WBC, UA None Seen /hpf      Epithelial Cells Occasional /hpf      Bacteria, UA Occasional /hpf      OTHER OBSERVATIONS Yeast Cells Present     MUCUS THREADS Occasional (A)    Comprehensive metabolic panel [[id number]]  (Abnormal) Collected:  01/28/19 0620    Lab Status:  Final result Specimen:  Blood from Arm, Right Updated:  01/28/19 1780     Sodium 139 mmol/L      Potassium 4 2 mmol/L      Chloride 107 mmol/L      CO2 29 mmol/L      ANION GAP 3 (L) mmol/L      BUN 18 mg/dL      Creatinine 1 10 mg/dL      Glucose 105 mg/dL      Calcium 8 7 mg/dL      AST 21 U/L      ALT 37 U/L      Alkaline Phosphatase 79 U/L      Total Protein 7 3 g/dL      Albumin 3 9 g/dL      Total Bilirubin 0 48 mg/dL      eGFR 76 ml/min/1 73sq m     Narrative:         National Kidney Disease Education Program recommendations are as follows:  GFR calculation is accurate only with a steady state creatinine  Chronic Kidney disease less than 60 ml/min/1 73 sq  meters  Kidney failure less than 15 ml/min/1 73 sq  meters      CBC and differential [[id number]]  (Abnormal) Collected:  01/28/19 0620    Lab Status:  Final result Specimen: Blood from Arm, Right Updated:  01/28/19 0644     WBC 3 80 (L) Thousand/uL      RBC 5 24 Million/uL      Hemoglobin 15 9 g/dL      Hematocrit 46 9 %      MCV 90 fL      MCH 30 3 pg      MCHC 33 9 g/dL      RDW 12 2 %      MPV 10 7 fL      Platelets 662 (L) Thousands/uL      nRBC 0 /100 WBCs      Neutrophils Relative 61 %      Immat GRANS % 0 %      Lymphocytes Relative 28 %      Monocytes Relative 8 %      Eosinophils Relative 2 %      Basophils Relative 1 %      Neutrophils Absolute 2 30 Thousands/µL      Immature Grans Absolute 0 01 Thousand/uL      Lymphocytes Absolute 1 05 Thousands/µL      Monocytes Absolute 0 32 Thousand/µL      Eosinophils Absolute 0 09 Thousand/µL      Basophils Absolute 0 03 Thousands/µL     POCT urinalysis dipstick [[id number]]  (Normal) Resulted:  01/28/19 0615    Lab Status:  Final result Updated:  01/28/19 0615     Color, UA brown    ED Urine Macroscopic [[id number]]  (Abnormal) Collected:  01/28/19 0615    Lab Status:  Final result Specimen:  Urine Updated:  01/28/19 0613     Color, UA Brown     Clarity, UA Cloudy     pH, UA 5 5     Leukocytes, UA Negative     Nitrite, UA Negative     Protein, UA 30 (1+) (A) mg/dl      Glucose, UA Negative mg/dl      Ketones, UA Negative mg/dl      Urobilinogen, UA 0 2 E U /dl      Bilirubin, UA Interference- unable to analyze (A)     Blood, UA Large (A)     Specific Gravity, UA >=1 030    Narrative:       CLIN[facility] RESULT                 CT renal stone study abdomen pelvis wo contrast   Final Result by Jitendra Harris MD (01/28 7855)      Mildly obstructing 3 mm calculus in the proximal right ureter  Workstation performed: [id number]               Procedures  Procedures      Phone Consults  ED Phone Contact    ED Course  ED Course as of Jan 29 0349 Mon Jan 28, 2019   6692 Patient re-evaluated at bedside  Currently feels well, is pain-free after receiving IV Toradol   -lab work and Imaging reviewed    CT stone study significant for a 3 mm stone in the proximal ureter, without significant hydronephrosis  The patient was updated of this result   -urology paged, currently awaiting call back  Disposition pending Urology recommendations   -Patient signed out to Dr Earnest Joiner Time    Disposition  Final diagnoses:   Right kidney stone     Time reflects when diagnosis was documented in both MDM as applicable and the Disposition within this note     Time User Action Codes Description Comment    1/28/2019 10:04 AM Elsi Handley Add [N20 0] Right kidney stone       ED Disposition     ED Disposition Condition Comment    Discharge  Thedora Sly discharge to home/self care  Condition at discharge: Stable        Follow-up Information     Follow up With Specialties Details Why [address] [facility] Schedule an appointment as soon as possible for a visit Follow up for resolution of R kidney stone and flank pain [address] [place] [phone]  [facility] [place], [address] [place], [place], [phone]          Discharge Medication List as of 1/28/2019 11:18 AM      START taking these medications    Details   naproxen (NAPROSYN) 500 mg tablet Take 1 tablet (500 mg total) by mouth 2 (two) times a day with meals, Starting Mon 1/28/2019, Normal      tamsulosin (FLOMAX) 0 4 mg Take 1 capsule (0 4 mg total) by mouth daily with dinner for 14 days, Starting Mon 1/28/2019, Until Mon 2/11/2019, Normal         CONTINUE these medications which have NOT CHANGED    Details   Omega-3 Fatty Acids (FISH OIL) 1000 MG CPDR Take by mouth daily, Starting Fri 6/29/2018, No Print      simvastatin (ZOCOR) 20 mg tablet Take 1 tablet (20 mg total) by mouth daily, Starting Thu 1/3/2019, Normal           No discharge procedures on file      ED Provider  Attending physically available and evaluated Brooke Jones Toi Schaffer I managed the patient along with the ED Attending      Electronically Signed by         Brandon Lawler MD  01/29/19 6201

## 2019-01-28 NOTE — DISCHARGE INSTRUCTIONS
Kidney Stones   WHAT YOU NEED TO KNOW:   Kidney stones form in the urinary system when the water and waste in your urine are out of balance  When this happens, certain types of waste crystals separate from the urine  The crystals build up and form kidney stones  You may have 1 or more kidney stones  DISCHARGE INSTRUCTIONS:   Return to the emergency department if:   · You have vomiting that is not relieved by medicine  Contact your healthcare provider if:   · You have a fever  · You have trouble passing urine  · You see blood in your urine  · You have severe pain  · You have any questions or concerns about your condition or care  Medicines:   · NSAIDs , such as ibuprofen, help decrease swelling, pain, and fever  This medicine is available with or without a doctor's order  NSAIDs can cause stomach bleeding or kidney problems in certain people  If you take blood thinner medicine, always ask your healthcare provider if NSAIDs are safe for you  Always read the medicine label and follow directions  · Prescription medicine  may be given  Ask how to take this medicine safely  · Medicines  to balance your electrolytes may be needed  · Take your medicine as directed  Contact your healthcare provider if you think your medicine is not helping or if you have side effects  Tell him or her if you are allergic to any medicine  Keep a list of the medicines, vitamins, and herbs you take  Include the amounts, and when and why you take them  Bring the list or the pill bottles to follow-up visits  Carry your medicine list with you in case of an emergency  Follow up with your healthcare provider as directed: You may need to return for more tests  Write down your questions so you remember to ask them during your visits  Self-care:   · Drink plenty of liquids  Your healthcare provider may tell you to drink at least 8 to 12 (eight-ounce) cups of liquids each day   This helps flush out the kidney stones when you urinate  Water is the best liquid to drink  · Strain your urine every time you go to the bathroom  Urinate through a strainer or a piece of thin cloth to catch the stones  Take the stones to your healthcare provider so they can be sent to the lab for tests  This will help your healthcare providers plan the best treatment for you  · Eat a variety of healthy foods  Healthy foods include fruits, vegetables, whole-grain breads, low-fat dairy products, beans, and fish  You may need to limit how much sodium (salt) or protein you eat  Ask for information about the best foods for you  · Stay active  Your stones may pass more easily by if you stay active  Ask about the best activities for you  After you pass your kidney stones:  Once you have passed your kidney stones, your healthcare provider may  order a 24-hour urine test  Results from a 24-hour urine test will help your healthcare provider plan ways to prevent more stones from forming  If you are told to do a 24-hour test, your healthcare provider will give you more instructions  © 2017 [facility] Information is for End User's use only and may not be sold, redistributed or otherwise used for commercial purposes  All illustrations and images included in CareNotes® are the copyrighted property of A D A M , Inc  or Beni Rangel  The above information is an  only  It is not intended as medical advice for individual conditions or treatments  Talk to your doctor, nurse or pharmacist before following any medical regimen to see if it is safe and effective for you

## 2019-01-28 NOTE — TELEPHONE ENCOUNTER
1st attempt, left message for patient to call to schedule follow up  If patient calls back you can schedule with Ash Perdomo at [place] office since there is plenty of availability there and patient is familiar with that office

## 2019-01-28 NOTE — CONSULTS
UROLOGY CONSULTATION NOTE     Patient Identifiers: Dalila Howard (MRN [id number])  Service Requesting Consultation:  Emergency department  Service Providing Consultation:  Urology, Barrera Milton, Zenobia Kinsey    Date of Service: 1/28/2019  Inpatient consult to Urology  Consult performed by: Elmer Pascal ordered by: Unique Gerber      Reason for Consultation:  Nephrolithiasis    ASSESSMENT:     Nephrolithiasis  · CT scan of abdomen and pelvis performed 01/20/2019 shows a 3 mm partially obstructing calculi in the proximal ureter causing mild hydronephrosis  · Creatinine 1 10  · No leukocytosis noted  · Patient with history of multiple kidney stones in the past   Recommend medical expulsive therapy and follow-up a patient  · Tamsulosin 0 4 mg p  O  Daily x 14 days  · Narcotic pain medication per emergency department  · Encourage increased p o  Fluid intake upwards to 60 oz of water a day  · Strain all urine  · Return to the emergency department call the office for hematuria, worsening pain, inability urinate, fever and chills  · Patient agreeable to this plan  History of Present Illness:     Dalila Howard is a 46 y o  old with a history of nephrolithiasis with  instrumentation in 2017  He presents to the emergency department at 5:00 a m  With a complaint of right flank/back pain  Denies fever, chills, hematuria, dysuria, urinary frequency, urgency and retention  Workup in the emergency department reveals no leukocytosis and creatinine level 1 10; which is his baseline  He is afebrile with VSS  CT of the abdomen and pelvis reveals a 3 mm partially obstructing stone in the right ureter with mild hydronephrosis       Past Medical, Past Surgical History:     Past Medical History:   Diagnosis Date    Renal disorder    :    Past Surgical History:   Procedure Laterality Date    KNEE ARTHROSCOPY Left 06/06/2005    w/Medial Meniscectomy    [place] CYSTO/URETERO/PYELOSCOPY W/LITHOTRIPSY Left 2/9/2017 Procedure: HOLMIUM [place] [facility] ;  Surgeon: Brandi Meyer MD;  [facility];  Service: Urology    [place] CYSTOURETHROSCOPY,URETER CATHETER Left 2017    Procedure: CYSTOSCOPY RETROGRADE PYELOGRAM WITH STENT INSERTION;  Surgeon: Brandi Meyer MD;  [facility];  Service: Urology   :    Medications, Allergies:     No current facility-administered medications for this encounter  Allergies:  No Known Allergies:    Social and Family History:   Social History:   Social History   Substance Use Topics    Smoking status: Never Smoker    Smokeless tobacco: Never Used    Alcohol use Yes      Comment: Social        History   Smoking Status    Never Smoker   Smokeless Tobacco    Never Used       Family History:  Family History   Problem Relation Age of Onset    Stroke Mother         CVA    Hypertension Mother         Essential    Hyperlipidemia Mother     Hypertension Father         Essential    Hyperlipidemia Father    :     Review of Systems:     General: Fever, chills, or night sweats: negative  Cardiac: Negative for chest pain  Pulmonary: Negative for shortness of breath  Gastrointestinal: Abdominal pain negative  Nausea, vomiting, or diarrhea negative,  Genitourinary: See HPI above  Patient does not have hematuria  All other systems queried were negative  Physical Exam:   General: Patient is pleasant and in NAD  Awake and alert  BP 98/55   Pulse 68   Temp (!) 97 3 °F (36 3 °C) (Oral)   Resp 18   Wt 79 4 kg (175 lb)   SpO2 97%   BMI 26 83 kg/m² Temp (24hrs), Av 3 °F (36 3 °C), Min:97 3 °F (36 3 °C), Max:97 3 °F (36 3 °C)  current; Temperature: (!) 97 3 °F (36 3 °C)  No intake/output data recorded  Skin: warm, dry, intact  Cardiac: S1S2, HRR, Peripheral edema: negative  Pulmonary: Non-labored breathing  Abdomen: Soft, non-tender, non-distended  No surgical scars    No masses, tenderness, hernias noted  Musculoskeletal: AROM with no joint deformity or tenderness  Neurology: alert, oriented x3, affect appropriate, no focal neurological deficits and moves all extremities well  Genitourinary: Positive CVA tenderness, negative suprapubic tenderness  CHAVEZ:   None    Labs:     Lab Results   Component Value Date    HGB 15 9 01/28/2019    HCT 46 9 01/28/2019    WBC 3 80 (L) 01/28/2019     (L) 01/28/2019   ]    Lab Results   Component Value Date    K 4 2 01/28/2019     01/28/2019    CO2 29 01/28/2019    BUN 18 01/28/2019    CREATININE 1 10 01/28/2019    CALCIUM 8 7 01/28/2019   ]    Imaging:   I personally reviewed the images and report of the following studies, and reviewed them with the patient:    CT ABDOMEN AND PELVIS WITHOUT IV CONTRAST - LOW DOSE RENAL STONE      INDICATION:   rt flank pain      COMPARISON:  2/3/2017      TECHNIQUE:  Low dose thin section CT examination of the abdomen and pelvis was performed without intravenous or oral contrast according to a protocol specifically designed to evaluate for urinary tract calculus  Axial, sagittal, and coronal 2D   reformatted images were created from the source data and submitted for interpretation  Evaluation for pathology in the abdomen and pelvis that is unrelated to urinary tract calculi is limited       Radiation dose length product (DLP) for this visit:  330 08 mGy-cm   This examination, like all CT scans performed in the [facility], was performed utilizing techniques to minimize radiation dose exposure, including the use of iterative   reconstruction and automated exposure control       FINDINGS:     RIGHT KIDNEY AND URETER:  There is a 3 mm calculus in the proximal ureter, at the L3-4 disc level, causing mild hydronephrosis  There is no significant perinephric stranding      LEFT KIDNEY AND URETER:  No urinary tract calculi    No hydronephrosis or hydroureter      URINARY BLADDER:   Unremarkable       No significant abnormality in the visualized lung bases      Limited low radiation dose noncontrast CT evaluation demonstrates no clinically significant abnormality of liver, spleen, pancreas, or adrenal glands  No calcified gallstones or gallbladder wall thickening noted  No ascites or bulky lymphadenopathy on this limited noncontrast study  Bowel loops appear unremarkable  Limited evaluation demonstrates no evidence to suggest acute appendicitis  No acute fracture or destructive osseous lesion is identified      IMPRESSION:     Mildly obstructing 3 mm calculus in the proximal right ureter  Thank you for allowing me to participate in this patients care  Please do not hesitate to call with any additional questions      JESIKA Keen

## 2019-01-28 NOTE — TELEPHONE ENCOUNTER
Patient seen in the emergency room this morning with a 3 mm stone will be given the opportunity for medical expulsive therapy  will need to schedule an appointment in approximately 7-10 days for follow-up in the office  Please reach out to patient and schedule an appointment  He is a patient of Dr Robles Bayron

## 2019-02-03 NOTE — ED ATTENDING ATTESTATION
I, [address], , saw and evaluated the patient  I have discussed the patient with the resident/non-physician practitioner and agree with the resident's/non-physician practitioner's findings, Plan of Care, and MDM as documented in the resident's/non-physician practitioner's note, except where noted  All available labs and Radiology studies were reviewed  At this point I agree with the current assessment done in the Emergency Department  I have conducted an independent evaluation of this patient a history and physical is as follows:    48-year-old male presents with right flank pain that started Saturday, similar to previous kidney stone  Patient's previous kidney stone required stent placement  Denies dysuria  On exam-no acute distress, heart regular, lungs clear, abdomen nontender, soft    Plan-CT stone study, check urine, check electrolytes    Critical Care Time  Procedures

## 2019-02-08 ENCOUNTER — OFFICE VISIT (OUTPATIENT)
Dept: UROLOGY | Facility: CLINIC | Age: 54
End: 2019-02-08
Payer: COMMERCIAL

## 2019-02-08 VITALS
HEIGHT: 69 IN | BODY MASS INDEX: 26.66 KG/M2 | HEART RATE: 75 BPM | DIASTOLIC BLOOD PRESSURE: 72 MMHG | SYSTOLIC BLOOD PRESSURE: 108 MMHG | WEIGHT: 180 LBS

## 2019-02-08 DIAGNOSIS — R31.29 MICROSCOPIC HEMATURIA: ICD-10-CM

## 2019-02-08 DIAGNOSIS — N20.0 CALCULUS OF KIDNEY: Primary | ICD-10-CM

## 2019-02-08 DIAGNOSIS — N20.1 CALCULUS, URETERAL: ICD-10-CM

## 2019-02-08 LAB
SL AMB  POCT GLUCOSE, UA: ABNORMAL
SL AMB LEUKOCYTE ESTERASE,UA: ABNORMAL
SL AMB POCT BILIRUBIN,UA: ABNORMAL
SL AMB POCT BLOOD,UA: ABNORMAL
SL AMB POCT CLARITY,UA: CLEAR
SL AMB POCT COLOR,UA: YELLOW
SL AMB POCT KETONES,UA: ABNORMAL
SL AMB POCT NITRITE,UA: ABNORMAL
SL AMB POCT PH,UA: 5
SL AMB POCT SPECIFIC GRAVITY,UA: 1.01
SL AMB POCT URINE PROTEIN: ABNORMAL
SL AMB POCT UROBILINOGEN: ABNORMAL

## 2019-02-08 PROCEDURE — 81002 URINALYSIS NONAUTO W/O SCOPE: CPT | Performed by: PHYSICIAN ASSISTANT

## 2019-02-08 PROCEDURE — 99213 OFFICE O/P EST LOW 20 MIN: CPT | Performed by: PHYSICIAN ASSISTANT

## 2019-02-08 RX ORDER — OXYCODONE HYDROCHLORIDE AND ACETAMINOPHEN 5; 325 MG/1; MG/1
1 TABLET ORAL EVERY 4 HOURS PRN
Qty: 24 TABLET | Refills: 0 | Status: SHIPPED | OUTPATIENT
Start: 2019-02-08 | End: 2019-02-12 | Stop reason: SDUPTHER

## 2019-02-08 NOTE — PROGRESS NOTES
UROLOGY PROGRESS NOTE   Patient Identifiers: Nikolai Rincon (MRN [id number])  Date of Service: 2/8/2019    Subjective:     50-year-old man history kidney stones known to me was in the emergency room on January 28th with right flank pain  The CT scan shows a 3 mm proximal right ureter calculus  ( appears to be more like 5 mm when  I reviewed the films) he comes in today for follow-up he has persistent pain  No fever no nausea or vomiting  Urinalysis shows 3+ microscopic blood  Patient has    See above      Objective:     VITALS:    Vitals:    02/08/19 1426   BP: 108/72   Pulse: 75     AUA SYMPTOM SCORE      Most Recent Value   AUA SYMPTOM SCORE   How often have you had a sensation of not emptying your bladder completely after you finished urinating? 1   How often have you had to urinate again less than two hours after you finished urinating? 2   How often have you found you stopped and started again several times when you urinate?  0   How often have you found it difficult to postpone urination? 1   How often have you had a weak urinary stream?  0   How often have you had to push or strain to begin urination? 0   How many times did you most typically get up to urinate from the time you went to bed at night until the time you got up in the morning?   1   Quality of Life: If you were to spend the rest of your life with your urinary condition just the way it is now, how would you feel about that?  0   AUA SYMPTOM SCORE  5            LABS:  Lab Results   Component Value Date    HGB 15 9 01/28/2019    HCT 46 9 01/28/2019    WBC 3 80 (L) 01/28/2019     (L) 01/28/2019   ]    Lab Results   Component Value Date    K 4 2 01/28/2019     01/28/2019    CO2 29 01/28/2019    BUN 18 01/28/2019    CREATININE 1 10 01/28/2019    CALCIUM 8 7 01/28/2019   ]        INPATIENT MEDS:    Current Outpatient Prescriptions:     naproxen (NAPROSYN) 500 mg tablet, Take 1 tablet (500 mg total) by mouth 2 (two) times a day with meals, Disp: 30 tablet, Rfl: 0    Omega-3 Fatty Acids (FISH OIL) 1000 MG CPDR, Take by mouth daily, Disp: , Rfl: 0    simvastatin (ZOCOR) 20 mg tablet, Take 1 tablet (20 mg total) by mouth daily, Disp: 90 tablet, Rfl: 1    tamsulosin (FLOMAX) 0 4 mg, Take 1 capsule (0 4 mg total) by mouth daily with dinner for 14 days, Disp: 14 capsule, Rfl: 0    oxyCODONE-acetaminophen (PERCOCET) 5-325 mg per tablet, Take 1 tablet by mouth every 4 (four) hours as needed for moderate pain Max Daily Amount: 6 tablets, Disp: 24 tablet, Rfl: 0      Physical Exam:   /72 (BP Location: Right arm, Patient Position: Sitting, Cuff Size: Adult)   Pulse 75   Ht 5' 8 5" (1 74 m)   Wt 81 6 kg (180 lb)   BMI 26 97 kg/m²   GEN: no acute distress    RESP: breathing comfortably with no accessory muscle use    ABD:  mild right CVA tenderness and soft, non-tender, non-distended   INCISION:    EXT: no significant peripheral edema       RADIOLOGY:   CT ABDOMEN AND PELVIS WITHOUT IV CONTRAST   IMPRESSION:     Mildly obstructing 3 mm calculus in the proximal right ureter      Assessment:     1 right-sided ureteral calculus with hydronephrosis    Plan:   -  Will schedule a renal ultrasound and KUB early next week and possible 5   Stone extraction towards the end of next week  - he is aware to go to the emergency room over the weekend if he has increased pain fever or persistent vomiting  -  -

## 2019-02-10 ENCOUNTER — HOSPITAL ENCOUNTER (EMERGENCY)
Facility: HOSPITAL | Age: 54
Discharge: HOME | End: 2019-02-10
Attending: EMERGENCY MEDICINE | Admitting: EMERGENCY MEDICINE
Payer: COMMERCIAL

## 2019-02-10 VITALS
OXYGEN SATURATION: 96 % | BODY MASS INDEX: 26.96 KG/M2 | SYSTOLIC BLOOD PRESSURE: 118 MMHG | HEART RATE: 69 BPM | TEMPERATURE: 97.3 F | RESPIRATION RATE: 16 BRPM | WEIGHT: 179.9 LBS | DIASTOLIC BLOOD PRESSURE: 68 MMHG

## 2019-02-10 DIAGNOSIS — N20.1 URETEROLITHIASIS: ICD-10-CM

## 2019-02-10 DIAGNOSIS — N20.0 KIDNEY STONE: Primary | ICD-10-CM

## 2019-02-10 LAB
ANION GAP SERPL CALCULATED.3IONS-SCNC: 6 MMOL/L (ref 4–13)
BACTERIA UR QL AUTO: ABNORMAL /HPF
BASOPHILS # BLD AUTO: 0.03 THOUSANDS/ΜL (ref 0–0.1)
BASOPHILS NFR BLD AUTO: 0 % (ref 0–1)
BILIRUB UR QL STRIP: NEGATIVE
BUN SERPL-MCNC: 19 MG/DL (ref 5–25)
CALCIUM SERPL-MCNC: 9.3 MG/DL (ref 8.3–10.1)
CHLORIDE SERPL-SCNC: 104 MMOL/L (ref 100–108)
CLARITY UR: CLEAR
CO2 SERPL-SCNC: 28 MMOL/L (ref 21–32)
COLOR UR: YELLOW
COLOR, POC: YELLOW
CREAT SERPL-MCNC: 1.27 MG/DL (ref 0.6–1.3)
EOSINOPHIL # BLD AUTO: 0.06 THOUSAND/ΜL (ref 0–0.61)
EOSINOPHIL NFR BLD AUTO: 1 % (ref 0–6)
ERYTHROCYTE [DISTWIDTH] IN BLOOD BY AUTOMATED COUNT: 12.1 % (ref 11.6–15.1)
GFR SERPL CREATININE-BSD FRML MDRD: 64 ML/MIN/1.73SQ M
GLUCOSE SERPL-MCNC: 105 MG/DL (ref 65–140)
GLUCOSE UR STRIP-MCNC: NEGATIVE MG/DL
HCT VFR BLD AUTO: 45.5 % (ref 36.5–49.3)
HGB BLD-MCNC: 15.5 G/DL (ref 12–17)
HGB UR QL STRIP.AUTO: ABNORMAL
HYALINE CASTS #/AREA URNS LPF: ABNORMAL /LPF
IMM GRANULOCYTES # BLD AUTO: 0.03 THOUSAND/UL (ref 0–0.2)
IMM GRANULOCYTES NFR BLD AUTO: 0 % (ref 0–2)
KETONES UR STRIP-MCNC: NEGATIVE MG/DL
LEUKOCYTE ESTERASE UR QL STRIP: NEGATIVE
LYMPHOCYTES # BLD AUTO: 0.77 THOUSANDS/ΜL (ref 0.6–4.47)
LYMPHOCYTES NFR BLD AUTO: 8 % (ref 14–44)
MCH RBC QN AUTO: 30.3 PG (ref 26.8–34.3)
MCHC RBC AUTO-ENTMCNC: 34.1 G/DL (ref 31.4–37.4)
MCV RBC AUTO: 89 FL (ref 82–98)
MONOCYTES # BLD AUTO: 0.58 THOUSAND/ΜL (ref 0.17–1.22)
MONOCYTES NFR BLD AUTO: 6 % (ref 4–12)
NEUTROPHILS # BLD AUTO: 7.72 THOUSANDS/ΜL (ref 1.85–7.62)
NEUTS SEG NFR BLD AUTO: 85 % (ref 43–75)
NITRITE UR QL STRIP: NEGATIVE
NON-SQ EPI CELLS URNS QL MICRO: ABNORMAL /HPF
NRBC BLD AUTO-RTO: 0 /100 WBCS
PH UR STRIP.AUTO: 6 [PH] (ref 4.5–8)
PLATELET # BLD AUTO: 137 THOUSANDS/UL (ref 149–390)
PMV BLD AUTO: 10.6 FL (ref 8.9–12.7)
POTASSIUM SERPL-SCNC: 4.7 MMOL/L (ref 3.5–5.3)
PROT UR STRIP-MCNC: ABNORMAL MG/DL
RBC # BLD AUTO: 5.12 MILLION/UL (ref 3.88–5.62)
RBC #/AREA URNS AUTO: ABNORMAL /HPF
SODIUM SERPL-SCNC: 138 MMOL/L (ref 136–145)
SP GR UR STRIP.AUTO: 1.02 (ref 1–1.03)
UROBILINOGEN UR QL STRIP.AUTO: 0.2 E.U./DL
WBC # BLD AUTO: 9.19 THOUSAND/UL (ref 4.31–10.16)
WBC #/AREA URNS AUTO: ABNORMAL /HPF

## 2019-02-10 PROCEDURE — 96374 THER/PROPH/DIAG INJ IV PUSH: CPT

## 2019-02-10 PROCEDURE — 80048 BASIC METABOLIC PNL TOTAL CA: CPT | Performed by: EMERGENCY MEDICINE

## 2019-02-10 PROCEDURE — 99284 EMERGENCY DEPT VISIT MOD MDM: CPT

## 2019-02-10 PROCEDURE — 81003 URINALYSIS AUTO W/O SCOPE: CPT

## 2019-02-10 PROCEDURE — 36415 COLL VENOUS BLD VENIPUNCTURE: CPT | Performed by: EMERGENCY MEDICINE

## 2019-02-10 PROCEDURE — 85025 COMPLETE CBC W/AUTO DIFF WBC: CPT | Performed by: EMERGENCY MEDICINE

## 2019-02-10 PROCEDURE — 81001 URINALYSIS AUTO W/SCOPE: CPT

## 2019-02-10 PROCEDURE — 96375 TX/PRO/DX INJ NEW DRUG ADDON: CPT

## 2019-02-10 RX ORDER — ONDANSETRON 2 MG/ML
4 INJECTION INTRAMUSCULAR; INTRAVENOUS ONCE
Status: COMPLETED | OUTPATIENT
Start: 2019-02-10 | End: 2019-02-10

## 2019-02-10 RX ORDER — MORPHINE SULFATE 10 MG/ML
6 INJECTION, SOLUTION INTRAMUSCULAR; INTRAVENOUS ONCE
Status: COMPLETED | OUTPATIENT
Start: 2019-02-10 | End: 2019-02-10

## 2019-02-10 RX ADMIN — MORPHINE SULFATE 6 MG: 10 INJECTION INTRAVENOUS at 12:02

## 2019-02-10 RX ADMIN — ONDANSETRON 4 MG: 2 INJECTION INTRAMUSCULAR; INTRAVENOUS at 12:02

## 2019-02-10 NOTE — ED ATTENDING ATTESTATION
Paul Godinez MD, saw and evaluated the patient  I have discussed the patient with the resident/non-physician practitioner and agree with the resident's/non-physician practitioner's findings, Plan of Care, and MDM as documented in the resident's/non-physician practitioner's note, except where noted  All available labs and Radiology studies were reviewed  At this point I agree with the current assessment done in the Emergency Department  I have conducted an independent evaluation of this patient a history and physical is as follows:      Critical Care Time  Procedures     47 yo male with hx of kidney stones and dx right sided 5 mm stone and no relief of pain with pain meds  Pt following with urology  Nausea, no vomiting, no fever, no abdominal pain, no blood in urine  Vss, afebrile, lungs cta, rrr, abdomen soft nontender, right flank tenderness  Labs, urine, pain meds

## 2019-02-10 NOTE — ED PROVIDER NOTES
History  Chief Complaint   Patient presents with    Flank Pain     Patient states known kidney stone on right side  Repeat x-ray due next wednesday  Last percocet 2230 last night  Tylenol at 0400  naproxen at 0900     45 yo M with known 5mm R sided kidney stone presents to the ED for worsening R flank pain and nausea  Pt says he saw urology and is supposed to have repeat XR and U/S on Wednesday  He is on flomax and has NSAIDs and percocet at home  He has been trying not to take the percocet to avoid narcotics but did take one last night  Pt denies any dysuria but thinks his urination has been decreased  Continues to have hematuria  Has no vomited  No fever  Has had prior kidney stone that required surgical intervention  Prior to Admission Medications   Prescriptions Last Dose Informant Patient Reported? Taking?    Omega-3 Fatty Acids (FISH OIL) 1000 MG CPDR  Self No No   Sig: Take by mouth daily   naproxen (NAPROSYN) 500 mg tablet  Self No No   Sig: Take 1 tablet (500 mg total) by mouth 2 (two) times a day with meals   oxyCODONE-acetaminophen (PERCOCET) 5-325 mg per tablet   No No   Sig: Take 1 tablet by mouth every 4 (four) hours as needed for moderate pain Max Daily Amount: 6 tablets   simvastatin (ZOCOR) 20 mg tablet  Self No No   Sig: Take 1 tablet (20 mg total) by mouth daily   tamsulosin (FLOMAX) 0 4 mg  Self No No   Sig: Take 1 capsule (0 4 mg total) by mouth daily with dinner for 14 days      Facility-Administered Medications: None       Past Medical History:   Diagnosis Date    Kidney stones     Renal disorder        Past Surgical History:   Procedure Laterality Date    KNEE ARTHROSCOPY Left 06/06/2005    w/Medial Meniscectomy    PA CYSTO/URETERO/PYELOSCOPY W/LITHOTRIPSY Left 2/9/2017    Procedure: HOLMIUM LASER, URETEROSOCPY ;  Surgeon: Joellen Rodriguez MD;  [facility];  Service: Urology    PA CYSTOURETHROSCOPY,URETER CATHETER Left 2/9/2017    Procedure: CYSTOSCOPY RETROGRADE PYELOGRAM WITH STENT INSERTION;  Surgeon: Karlee Abrams MD;  [facility];  Service: Urology       Family History   Problem Relation Age of Onset    Stroke Mother         CVA    Hypertension Mother         Essential    Hyperlipidemia Mother     Hypertension Father         Essential    Hyperlipidemia Father      I have reviewed and agree with the history as documented  Social History     Tobacco Use    Smoking status: Never Smoker    Smokeless tobacco: Never Used   Substance Use Topics    Alcohol use: Yes     Comment: Social    Drug use: No        Review of Systems   Constitutional: Negative for chills, fatigue and fever  HENT: Negative for congestion, rhinorrhea, sore throat and trouble swallowing  Eyes: Negative for pain, discharge and visual disturbance  Respiratory: Negative for cough, chest tightness and shortness of breath  Cardiovascular: Negative for chest pain, palpitations and leg swelling  Gastrointestinal: Positive for nausea  Negative for abdominal pain and vomiting  Genitourinary: Positive for decreased urine volume, flank pain and hematuria  Negative for dysuria, frequency and urgency  Musculoskeletal: Negative for gait problem, neck pain and neck stiffness  Skin: Negative for color change, rash and wound  Neurological: Negative for dizziness, syncope, light-headedness and headaches         Physical Exam  ED Triage Vitals   Temperature Pulse Respirations Blood Pressure SpO2   02/10/19 1129 02/10/19 1129 02/10/19 1129 02/10/19 1129 02/10/19 1129   (!) 97 3 °F (36 3 °C) 86 18 127/67 99 %      Temp Source Heart Rate Source Patient Position - Orthostatic VS BP Location FiO2 (%)   02/10/19 1129 02/10/19 1129 02/10/19 1129 02/10/19 1129 --   Tympanic Monitor Sitting Right arm       Pain Score       02/10/19 1202       7           Orthostatic Vital Signs  Vitals:    02/10/19 1129 02/10/19 1326 02/10/19 1505   BP: 127/67 126/72 118/68   Pulse: 86 77 69   Patient Position - Orthostatic VS: Sitting         Physical Exam   Constitutional: He is oriented to person, place, and time  He appears well-developed and well-nourished  No distress  HENT:   Head: Normocephalic and atraumatic  Mouth/Throat: Oropharynx is clear and moist    Eyes: Conjunctivae and EOM are normal  Right eye exhibits no discharge  Left eye exhibits no discharge  Neck: Normal range of motion  Neck supple  nontender   Cardiovascular: Normal rate, regular rhythm and intact distal pulses  Pulmonary/Chest: Effort normal and breath sounds normal  No stridor  No respiratory distress  Abdominal: Soft  Bowel sounds are normal  There is tenderness (R flank, R CVA tenderness)  There is no rebound and no guarding  Musculoskeletal: Normal range of motion  He exhibits no edema or tenderness  Neurological: He is alert and oriented to person, place, and time  No sensory deficit  He exhibits normal muscle tone  Skin: Skin is warm and dry  Capillary refill takes less than 2 seconds  Nursing note and vitals reviewed        ED Medications  Medications   morphine (PF) 10 mg/mL injection 6 mg (6 mg Intravenous Given 2/10/19 1202)   ondansetron (ZOFRAN) injection 4 mg (4 mg Intravenous Given 2/10/19 1202)       Diagnostic Studies  Results Reviewed     Procedure Component Value Units Date/Time    Urine Microscopic [[id number]]  (Abnormal) Collected:  02/10/19 1424    Lab Status:  Final result Specimen:  Urine, Clean Catch Updated:  02/10/19 1507     RBC, UA Innumerable /hpf      WBC, UA 4-10 /hpf      Epithelial Cells None Seen /hpf      Bacteria, UA None Seen /hpf      Hyaline Casts, UA None Seen /lpf     POCT urinalysis dipstick [[id number]]  (Normal) Resulted:  02/10/19 1422    Lab Status:  Final result Specimen:  Urine Updated:  02/10/19 1422     Color, UA yellow    ED Urine Macroscopic [[id number]]  (Abnormal) Collected:  02/10/19 1424    Lab Status:  Final result Specimen:  Urine Updated:  02/10/19 1421 Color, UA Yellow     Clarity, UA Clear     pH, UA 6 0     Leukocytes, UA Negative     Nitrite, UA Negative     Protein, UA Trace mg/dl      Glucose, UA Negative mg/dl      Ketones, UA Negative mg/dl      Urobilinogen, UA 0 2 E U /dl      Bilirubin, UA Negative     Blood, UA Large     Specific [place], UA 1 020    Narrative:       CLINITEK RESULT    Basic metabolic panel [[id number]] Collected:  02/10/19 1205    Lab Status:  Final result Specimen:  Blood from Arm, Right Updated:  02/10/19 1228     Sodium 138 mmol/L      Potassium 4 7 mmol/L      Chloride 104 mmol/L      CO2 28 mmol/L      ANION GAP 6 mmol/L      BUN 19 mg/dL      Creatinine 1 27 mg/dL      Glucose 105 mg/dL      Calcium 9 3 mg/dL      eGFR 64 ml/min/1 73sq m     Narrative:       National Kidney Disease Education Program recommendations are as follows:  GFR calculation is accurate only with a steady state creatinine  Chronic Kidney disease less than 60 ml/min/1 73 sq  meters  Kidney failure less than 15 ml/min/1 73 sq  meters      CBC and differential [[id number]]  (Abnormal) Collected:  02/10/19 1205    Lab Status:  Final result Specimen:  Blood from Arm, Right Updated:  02/10/19 1221     WBC 9 19 Thousand/uL      RBC 5 12 Million/uL      Hemoglobin 15 5 g/dL      Hematocrit 45 5 %      MCV 89 fL      MCH 30 3 pg      MCHC 34 1 g/dL      RDW 12 1 %      MPV 10 6 fL      Platelets 703 Thousands/uL      nRBC 0 /100 WBCs      Neutrophils Relative 85 %      Immat GRANS % 0 %      Lymphocytes Relative 8 %      Monocytes Relative 6 %      Eosinophils Relative 1 %      Basophils Relative 0 %      Neutrophils Absolute 7 72 Thousands/µL      Immature Grans Absolute 0 03 Thousand/uL      Lymphocytes Absolute 0 77 Thousands/µL      Monocytes Absolute 0 58 Thousand/µL      Eosinophils Absolute 0 06 Thousand/µL      Basophils Absolute 0 03 Thousands/µL                  No orders to display         Procedures  Procedures      Phone Consults  ED Phone Contact    ED Course                               MDM  Number of Diagnoses or Management Options  Kidney stone:   Ureterolithiasis:   Diagnosis management comments: BMP obtained without ALLISON  UA remains without evidence of UTI  Pt treated symptomatically with morphine and zofran (already took max NSAIDs at home)  Feeling better  Discussed discharge home with outpatient follow up with urology vs admission for worsening pain  Pt elects to go home and will start using the percocet that was prescribed to him previously  Return precautions discussed  Disposition  Final diagnoses:   Kidney stone   Ureterolithiasis     Time reflects when diagnosis was documented in both MDM as applicable and the Disposition within this note     Time User Action Codes Description Comment    2/10/2019  3:41 PM Camella Favor Add [N20 0] Kidney stone     2/10/2019  3:41 PM Camella Favor Add [N20 1] Ureterolithiasis       ED Disposition     ED Disposition Condition Date/Time Comment    Discharge Stable Sun Feb 10, 2019  3:41 PM Marisol Petite discharge to home/self care  Follow-up Information     Follow up With Specialties Details Why Mey Heath MD Urology In 3 days  [facility] [address]    [address] [place] [address]  [phone]            Discharge Medication List as of 2/10/2019  3:43 PM      CONTINUE these medications which have NOT CHANGED    Details   naproxen (NAPROSYN) 500 mg tablet Take 1 tablet (500 mg total) by mouth 2 (two) times a day with meals, Starting Mon 1/28/2019, Normal      Omega-3 Fatty Acids (FISH OIL) 1000 MG CPDR Take by mouth daily, Starting Fri 6/29/2018, No Print      oxyCODONE-acetaminophen (PERCOCET) 5-325 mg per tablet Take 1 tablet by mouth every 4 (four) hours as needed for moderate pain Max Daily Amount: 6 tablets, Starting Fri 2/8/2019, Print      simvastatin (ZOCOR) 20 mg tablet Take 1 tablet (20 mg total) by mouth daily, Starting Thu 1/3/2019, Normal      tamsulosin (FLOMAX) 0 4 mg Take 1 capsule (0 4 mg total) by mouth daily with dinner for 14 days, Starting Mon 1/28/2019, Until Mon 2/11/2019, Normal           No discharge procedures on file  ED Provider  Attending physically available and evaluated Yrn Lui I managed the patient along with the ED Attending      Electronically Signed by         Andry Asher MD  02/10/19 8048

## 2019-02-11 ENCOUNTER — APPOINTMENT (OUTPATIENT)
Dept: RADIOLOGY | Age: 54
End: 2019-02-11
Payer: COMMERCIAL

## 2019-02-11 ENCOUNTER — HOSPITAL ENCOUNTER (OUTPATIENT)
Dept: RADIOLOGY | Age: 54
Discharge: HOME | End: 2019-02-11
Payer: COMMERCIAL

## 2019-02-11 DIAGNOSIS — N20.1 CALCULUS, URETERAL: ICD-10-CM

## 2019-02-11 PROCEDURE — 76770 US EXAM ABDO BACK WALL COMP: CPT

## 2019-02-11 PROCEDURE — 74018 RADEX ABDOMEN 1 VIEW: CPT

## 2019-02-12 ENCOUNTER — TELEPHONE (OUTPATIENT)
Dept: UROLOGY | Facility: CLINIC | Age: 54
End: 2019-02-12

## 2019-02-12 DIAGNOSIS — N20.1 CALCULUS, URETERAL: ICD-10-CM

## 2019-02-12 RX ORDER — OXYCODONE HYDROCHLORIDE 5 MG/1
5 TABLET ORAL
Qty: 30 TABLET | Refills: 0 | Status: SHIPPED | OUTPATIENT
Start: 2019-02-12 | End: 2019-02-13 | Stop reason: SDUPTHER

## 2019-02-12 NOTE — TELEPHONE ENCOUNTER
I called pt this morning to schedule him for a cysto/ right ureteroscopy with holmium laser/ right retrograde and right stent insertion at [facility] on 2/14/19  I verbally went over all instructions with pt   And per his request I e-mailed a copy to [email or web address]

## 2019-02-13 ENCOUNTER — ANESTHESIA EVENT (OUTPATIENT)
Dept: PERIOP | Facility: HOSPITAL | Age: 54
End: 2019-02-13
Payer: COMMERCIAL

## 2019-02-13 DIAGNOSIS — N20.1 CALCULUS, URETERAL: ICD-10-CM

## 2019-02-13 RX ORDER — SODIUM CHLORIDE, SODIUM LACTATE, POTASSIUM CHLORIDE, CALCIUM CHLORIDE 600; 310; 30; 20 MG/100ML; MG/100ML; MG/100ML; MG/100ML
50 INJECTION, SOLUTION INTRAVENOUS CONTINUOUS
Status: CANCELLED | OUTPATIENT
Start: 2019-02-14

## 2019-02-13 RX ORDER — OXYCODONE HYDROCHLORIDE 5 MG/1
5 TABLET ORAL EVERY 4 HOURS PRN
Qty: 30 TABLET | Refills: 0 | Status: ON HOLD | OUTPATIENT
Start: 2019-02-13 | End: 2019-02-14 | Stop reason: SDUPTHER

## 2019-02-13 NOTE — PRE-PROCEDURE INSTRUCTIONS
Pre-Surgery Instructions:   Medication Instructions    oxyCODONE (ROXICODONE) 5 mg immediate release tablet Instructed patient per Anesthesia Guidelines

## 2019-02-14 ENCOUNTER — HOSPITAL ENCOUNTER (OUTPATIENT)
Facility: HOSPITAL | Age: 54
Setting detail: OUTPATIENT SURGERY
Discharge: HOME | End: 2019-02-14
Attending: UROLOGY | Admitting: UROLOGY
Payer: COMMERCIAL

## 2019-02-14 ENCOUNTER — APPOINTMENT (OUTPATIENT)
Dept: RADIOLOGY | Facility: HOSPITAL | Age: 54
End: 2019-02-14
Payer: COMMERCIAL

## 2019-02-14 ENCOUNTER — ANESTHESIA (OUTPATIENT)
Dept: PERIOP | Facility: HOSPITAL | Age: 54
End: 2019-02-14
Payer: COMMERCIAL

## 2019-02-14 VITALS
OXYGEN SATURATION: 98 % | HEART RATE: 95 BPM | TEMPERATURE: 97.2 F | SYSTOLIC BLOOD PRESSURE: 124 MMHG | RESPIRATION RATE: 16 BRPM | DIASTOLIC BLOOD PRESSURE: 67 MMHG

## 2019-02-14 DIAGNOSIS — N20.0 RIGHT KIDNEY STONE: ICD-10-CM

## 2019-02-14 DIAGNOSIS — N20.1 URETERAL CALCULI: ICD-10-CM

## 2019-02-14 DIAGNOSIS — N20.1 CALCULUS, URETERAL: ICD-10-CM

## 2019-02-14 PROCEDURE — 52356 CYSTO/URETERO W/LITHOTRIPSY: CPT | Performed by: UROLOGY

## 2019-02-14 PROCEDURE — 74450 X-RAY URETHRA/BLADDER: CPT

## 2019-02-14 PROCEDURE — 82360 CALCULUS ASSAY QUANT: CPT | Performed by: UROLOGY

## 2019-02-14 PROCEDURE — C2617 STENT, NON-COR, TEM W/O DEL: HCPCS | Performed by: UROLOGY

## 2019-02-14 PROCEDURE — C1769 GUIDE WIRE: HCPCS | Performed by: UROLOGY

## 2019-02-14 DEVICE — VARIABLE LENGTH INJECTION STENT SET
Type: IMPLANTABLE DEVICE | Site: URETER | Status: FUNCTIONAL
Brand: CONTOUR VL™ INJECTION STENT SET

## 2019-02-14 RX ORDER — OXYCODONE HYDROCHLORIDE 5 MG/1
TABLET ORAL
Qty: 12 TABLET | Refills: 0 | Status: SHIPPED | OUTPATIENT
Start: 2019-02-14 | End: 2019-06-28

## 2019-02-14 RX ORDER — CEFAZOLIN SODIUM 2 G/50ML
2000 SOLUTION INTRAVENOUS
Status: COMPLETED | OUTPATIENT
Start: 2019-02-15 | End: 2019-02-14

## 2019-02-14 RX ORDER — SODIUM CHLORIDE, SODIUM LACTATE, POTASSIUM CHLORIDE, CALCIUM CHLORIDE 600; 310; 30; 20 MG/100ML; MG/100ML; MG/100ML; MG/100ML
125 INJECTION, SOLUTION INTRAVENOUS CONTINUOUS
Status: CANCELLED | OUTPATIENT
Start: 2019-02-14

## 2019-02-14 RX ORDER — FENTANYL CITRATE/PF 50 MCG/ML
25 SYRINGE (ML) INJECTION
Status: DISCONTINUED | OUTPATIENT
Start: 2019-02-14 | End: 2019-02-14 | Stop reason: HOSPADM

## 2019-02-14 RX ORDER — ONDANSETRON 2 MG/ML
4 INJECTION INTRAMUSCULAR; INTRAVENOUS ONCE AS NEEDED
Status: DISCONTINUED | OUTPATIENT
Start: 2019-02-14 | End: 2019-02-14 | Stop reason: HOSPADM

## 2019-02-14 RX ORDER — LIDOCAINE HYDROCHLORIDE 10 MG/ML
INJECTION, SOLUTION INFILTRATION; PERINEURAL AS NEEDED
Status: DISCONTINUED | OUTPATIENT
Start: 2019-02-14 | End: 2019-02-14 | Stop reason: SURG

## 2019-02-14 RX ORDER — ROCURONIUM BROMIDE 10 MG/ML
INJECTION, SOLUTION INTRAVENOUS AS NEEDED
Status: DISCONTINUED | OUTPATIENT
Start: 2019-02-14 | End: 2019-02-14 | Stop reason: SURG

## 2019-02-14 RX ORDER — MAGNESIUM HYDROXIDE 1200 MG/15ML
LIQUID ORAL AS NEEDED
Status: DISCONTINUED | OUTPATIENT
Start: 2019-02-14 | End: 2019-02-14 | Stop reason: HOSPADM

## 2019-02-14 RX ORDER — ONDANSETRON 2 MG/ML
4 INJECTION INTRAMUSCULAR; INTRAVENOUS EVERY 8 HOURS PRN
Status: DISCONTINUED | OUTPATIENT
Start: 2019-02-14 | End: 2019-02-14 | Stop reason: HOSPADM

## 2019-02-14 RX ORDER — OXYCODONE HYDROCHLORIDE AND ACETAMINOPHEN 5; 325 MG/1; MG/1
1 TABLET ORAL EVERY 4 HOURS PRN
COMMUNITY
End: 2019-02-14 | Stop reason: HOSPADM

## 2019-02-14 RX ORDER — ACETAMINOPHEN 325 MG/1
650 TABLET ORAL EVERY 6 HOURS PRN
Status: DISCONTINUED | OUTPATIENT
Start: 2019-02-14 | End: 2019-02-14 | Stop reason: HOSPADM

## 2019-02-14 RX ORDER — OXYCODONE HYDROCHLORIDE 5 MG/1
5 TABLET ORAL EVERY 4 HOURS PRN
Status: DISCONTINUED | OUTPATIENT
Start: 2019-02-14 | End: 2019-02-14 | Stop reason: HOSPADM

## 2019-02-14 RX ORDER — TAMSULOSIN HYDROCHLORIDE 0.4 MG/1
CAPSULE ORAL
Qty: 30 CAPSULE | Refills: 0 | Status: SHIPPED | OUTPATIENT
Start: 2019-02-14 | End: 2019-06-28

## 2019-02-14 RX ORDER — SODIUM CHLORIDE, SODIUM LACTATE, POTASSIUM CHLORIDE, CALCIUM CHLORIDE 600; 310; 30; 20 MG/100ML; MG/100ML; MG/100ML; MG/100ML
125 INJECTION, SOLUTION INTRAVENOUS CONTINUOUS
Status: DISCONTINUED | OUTPATIENT
Start: 2019-02-14 | End: 2019-02-14 | Stop reason: HOSPADM

## 2019-02-14 RX ORDER — PROPOFOL 10 MG/ML
INJECTION, EMULSION INTRAVENOUS AS NEEDED
Status: DISCONTINUED | OUTPATIENT
Start: 2019-02-14 | End: 2019-02-14 | Stop reason: SURG

## 2019-02-14 RX ORDER — SODIUM CHLORIDE, SODIUM LACTATE, POTASSIUM CHLORIDE, CALCIUM CHLORIDE 600; 310; 30; 20 MG/100ML; MG/100ML; MG/100ML; MG/100ML
50 INJECTION, SOLUTION INTRAVENOUS CONTINUOUS
Status: DISCONTINUED | OUTPATIENT
Start: 2019-02-15 | End: 2019-02-14 | Stop reason: HOSPADM

## 2019-02-14 RX ORDER — KETOROLAC TROMETHAMINE 30 MG/ML
15 INJECTION, SOLUTION INTRAMUSCULAR; INTRAVENOUS EVERY 6 HOURS SCHEDULED
Status: DISCONTINUED | OUTPATIENT
Start: 2019-02-14 | End: 2019-02-14 | Stop reason: HOSPADM

## 2019-02-14 RX ORDER — HYDROMORPHONE HCL/PF 1 MG/ML
0.5 SYRINGE (ML) INJECTION
Status: DISCONTINUED | OUTPATIENT
Start: 2019-02-14 | End: 2019-02-14 | Stop reason: HOSPADM

## 2019-02-14 RX ORDER — FENTANYL CITRATE 50 UG/ML
INJECTION, SOLUTION INTRAMUSCULAR; INTRAVENOUS AS NEEDED
Status: DISCONTINUED | OUTPATIENT
Start: 2019-02-14 | End: 2019-02-14 | Stop reason: SURG

## 2019-02-14 RX ORDER — MIDAZOLAM HYDROCHLORIDE 1 MG/ML
INJECTION INTRAMUSCULAR; INTRAVENOUS AS NEEDED
Status: DISCONTINUED | OUTPATIENT
Start: 2019-02-14 | End: 2019-02-14 | Stop reason: SURG

## 2019-02-14 RX ADMIN — KETOROLAC TROMETHAMINE 15 MG: 30 INJECTION, SOLUTION INTRAMUSCULAR; INTRAVENOUS at 20:41

## 2019-02-14 RX ADMIN — LIDOCAINE HYDROCHLORIDE 50 MG: 10 INJECTION, SOLUTION INFILTRATION; PERINEURAL at 19:26

## 2019-02-14 RX ADMIN — ROCURONIUM BROMIDE 100 MG: 10 INJECTION, SOLUTION INTRAVENOUS at 19:26

## 2019-02-14 RX ADMIN — FENTANYL CITRATE 100 MCG: 50 INJECTION INTRAMUSCULAR; INTRAVENOUS at 19:17

## 2019-02-14 RX ADMIN — MIDAZOLAM HYDROCHLORIDE 2 MG: 1 INJECTION, SOLUTION INTRAMUSCULAR; INTRAVENOUS at 19:16

## 2019-02-14 RX ADMIN — FENTANYL CITRATE 50 MCG: 50 INJECTION INTRAMUSCULAR; INTRAVENOUS at 19:26

## 2019-02-14 RX ADMIN — FENTANYL CITRATE 100 MCG: 50 INJECTION INTRAMUSCULAR; INTRAVENOUS at 19:41

## 2019-02-14 RX ADMIN — PROPOFOL 200 MG: 10 INJECTION, EMULSION INTRAVENOUS at 19:26

## 2019-02-14 RX ADMIN — SODIUM CHLORIDE, SODIUM LACTATE, POTASSIUM CHLORIDE, AND CALCIUM CHLORIDE: .6; .31; .03; .02 INJECTION, SOLUTION INTRAVENOUS at 19:22

## 2019-02-14 RX ADMIN — CEFAZOLIN SODIUM 2000 MG: 2 SOLUTION INTRAVENOUS at 19:31

## 2019-02-14 NOTE — ANESTHESIA PREPROCEDURE EVALUATION
Review of Systems/Medical History  Patient summary reviewed  Chart reviewed      Cardiovascular  Exercise tolerance (METS): >4,  Hyperlipidemia,    Pulmonary  Shortness of breath,        GI/Hepatic    No GERD ,        Kidney stones,        Endo/Other  History of thyroid disease , hypothyroidism,      GYN       Hematology   Musculoskeletal    Arthritis     Neurology   Psychology           Physical Exam    Airway    Mallampati score: II  TM Distance: >3 FB  Neck ROM: full     Dental   No notable dental hx     Cardiovascular  Cardiovascular exam normal    Pulmonary  Pulmonary exam normal     Other Findings        Anesthesia Plan  ASA Score- 2     Anesthesia Type- general with ASA Monitors  Additional Monitors:   Airway Plan: ETT  Plan Factors-  Patient did not smoke on day of surgery  Induction- intravenous  Postoperative Plan-     Informed Consent- Anesthetic plan and risks discussed with patient

## 2019-02-14 NOTE — H&P
HISTORY AND PHYSICAL  ? ? Patient Name: Magdalene Pretty  Patient MRN: [id number]  Attending Provider: Steve Vasques MD  Service: Urology  Chief Complaint:    Right flank pain      HPI   Magdalene Pretty is a 46 y o  male with  right ureteral stone    I plan  cystoscopy, right retrograde pyelography right ureteroscopy with laser lithotripsy and right stent placement           Potential risks and complications discussed, and informed consent was given by the patient  Medications  Meds/Allergies     [START ON 2/15/2019] lactated ringers 50 mL/hr       Prior to Admission Medications   Prescriptions Last Dose Informant Patient Reported? Taking?    Omega-3 Fatty Acids (FISH OIL) 1000 MG CPDR 2/11/2019 Self No No   Sig: Take by mouth daily   naproxen (NAPROSYN) 500 mg tablet 2/14/2019 at 0800 Self No Yes   Sig: Take 1 tablet (500 mg total) by mouth 2 (two) times a day with meals   oxyCODONE (ROXICODONE) 5 mg immediate release tablet 2/14/2019 at 1500  No Yes   Sig: Take 1 tablet (5 mg total) by mouth every 4 (four) hours as needed for moderate painMax Daily Amount: 30 mg   oxyCODONE-acetaminophen (PERCOCET) 5-325 mg per tablet 2/14/2019 at 1300 Self Yes Yes   Sig: Take 1 tablet by mouth every 4 (four) hours as needed for moderate pain   simvastatin (ZOCOR) 20 mg tablet 2/13/2019 at 1800 Self No Yes   Sig: Take 1 tablet (20 mg total) by mouth daily   Patient taking differently: Take 40 mg by mouth daily    tamsulosin (FLOMAX) 0 4 mg 2/11/2019 Self No No   Sig: Take 1 capsule (0 4 mg total) by mouth daily with dinner for 14 days      Facility-Administered Medications: None       Current Facility-Administered Medications:     [START ON 2/15/2019] ceFAZolin (ANCEF) IVPB (premix) 2,000 mg, 2,000 mg, Intravenous, On Call To OR, Brigid Hernandez PA-C    [START ON 2/15/2019] lactated ringers infusion, 50 mL/hr, Intravenous, Continuous, Nini Peña MD  Review of Systems  10 point review of systems negative except as noted in HPI  Allergies  No Known Allergies  PMH  Past Medical History:   Diagnosis Date    Hematuria     Hyperlipidemia     Kidney stones 2017    Raynaud's phenomenon     Renal disorder      Past surgical history  Past Surgical History:   Procedure Laterality Date    CYSTOSCOPY  2017    KNEE ARTHROSCOPY Left 06/06/2005    w/Medial Meniscectomy    MA CYSTO/URETERO/PYELOSCOPY W/LITHOTRIPSY Left 2/9/2017    Procedure: HOLMIUM LASER, URETEROSOCPY ;  Surgeon: Laurita Linton MD;  [facility];  Service: Urology    MA CYSTOURETHROSCOPY,URETER CATHETER Left 2/9/2017    Procedure: CYSTOSCOPY RETROGRADE PYELOGRAM WITH STENT INSERTION;  Surgeon: Laurita Linton MD;  [facility];  Service: Urology     Social history  Social History     Tobacco Use    Smoking status: Never Smoker    Smokeless tobacco: Never Used   Substance Use Topics    Alcohol use: Yes     Comment: Social    Drug use: No     ?  Physical Exam  General appearance: alert and oriented, in no acute distress  Head: Normocephalic, without obvious abnormality, atraumatic  Neck: supple, symmetrical, trachea midline  Back: symmetric, no curvature  ROM normal  No CVA tenderness    Lungs: Normal respiratory effort  Heart: regular rate and rhythm  Abdomen: soft, non-tender; bowel sounds normal; no masses,  no organomegaly  Male genitalia: normal  Extremities: extremities normal, warm and well-perfused; no cyanosis, clubbing, or edema  Neurologic: Grossly normal     Jacques Sandhu MD

## 2019-02-15 ENCOUNTER — TELEPHONE (OUTPATIENT)
Dept: UROLOGY | Facility: MEDICAL CENTER | Age: 54
End: 2019-02-15

## 2019-02-15 NOTE — DISCHARGE INSTR - AVS FIRST PAGE
Ureteral stents are placed at the time of surgery to help keep the ureter opened and draining and allow it to heal    They do need to be removed or changed at intervals  to prevent future kidney damage  It is normal to have bladder irritability from the stent  The symptoms include urgency and frequency  Blood in the urine is also common  Urinary bleeding can come and go and is generally of no significance  It is also very normal to have back pain on the side of the stent when you urinate  Please call the office for fever, heavy bleeding with clots and difficulty voiding, difficulty voiding and severe pain that is not relieved by pain medication prescribed

## 2019-02-15 NOTE — INTERVAL H&P NOTE
H&P reviewed  After examining the patient I find no changes in the patients condition since the H&P had been written  X rays reviewed

## 2019-02-15 NOTE — NURSING NOTE
Returned form PACU at 2055  Alert and oriented  Pain 2/10 in penis due to surgical procedure  Patient stated it was due to "the stent"  No drainage from the penis  Respirations easy and non labored  IV fluids continue  Call bell in reach  Wife in the room  Currently drinking and eating

## 2019-02-15 NOTE — ANESTHESIA POSTPROCEDURE EVALUATION
Post-Op Assessment Note    CV Status:  Stable  Pain Score: 0    Pain management: adequate     Mental Status:  Sleepy   Hydration Status:  Stable   PONV Controlled:  None   Airway Patency:  Patent   Post Op Vitals Reviewed: Yes      Staff: Anesthesiologist           BP      Temp     Pulse     Resp      SpO2

## 2019-02-15 NOTE — OP NOTE
OPERATIVE REPORT  PATIENT NAME: Nicki Dorsey    :  1965  MRN: [id number]  Pt Location:  OR ROOM 10    SURGERY DATE: 2019    Surgeon(s) and Role:     * Meaghan Centeno MD - Primary    Preop Diagnosis:  Ureteral calculi [N20 1]    Post-Op Diagnosis Codes:     * Ureteral calculi [N20 1]    Procedure(s) (LRB):  CYSTOSCOPY URETEROSCOPY WITH LITHOTRIPSY HOLMIUM LASER, RETROGRADE PYELOGRAM AND INSERTION STENT URETERAL, stone basket extraction (Right)    Specimen(s):  ID Type Source Tests Collected by Time Destination   A :  Calculus Ureter, Right STONE ANALYSIS Meaghan Centeno MD 2019        Estimated Blood Loss:   Minimal    Drains:  * No LDAs found *    Anesthesia Type:   General    Operative Indications:  Ureteral calculi [N20 1]      Operative Findings:  Normal urethra, predominantly bilobar prostatic hypertrophy with elevated median bar, normal orthotopic ureteral orifices  No definite stone was seen on preliminary fluoroscopic images  On retrograde pyelography a filling defect was noted in the mid ureter just above the level of the vessels  This proved to be the ureteral stone  The stone was felt too large to basket and was broken into fragments, the largest which was basketed for analysis  A 6 [place] stent was placed at the conclusion of the procedure  The Dangler was cut short  The stent should be removed cystoscopically in 10 days to 2 weeks  Complications:   None    Procedure and Technique:  Patient was brought to the operating and properly identified  General anesthesia was administered  He was placed in lithotomy position prepped and draped in usual sterile fashion  Compression boots were employed  Intravenous antibiotic was administered  An appropriate time-out was performed  Cystourethroscopy was performed with 25 And[place]ran cystoscope with findings as above    Next, dilute Omnipaque and a tiger tail catheter were used to perform a right retrograde pyelogram  Findings are as above  With identification of the filling defect a 0 035 guidewire was passed easily up the right collecting system and into the kidney under fluoroscopic guidance  The short rigid ureteral scope was then employed  The ureteral scope was passed alongside the guidewire  A narrow area in the intramural ureter necessitated placement of a guidewire through the ureteral scope to aid in traversing this part of the ureter  The stone was encounter just above the pelvic vessels  It was felt too large to basket  The holmium laser was employed along with the 200 micron fiber break up the stone  The stone was fragmented into multiple small fragments all felt passable  The largest fragment was basketed for analysis  When fragmentation was complete, ureteral scope was withdrawn  The guidewire was backloaded through the cystoscope and a 6 Western Isi Kwart stent placed in standard fashion  It was pushed in to position with the pusher  Contrast injection confirmed good stent placement with nice coils in the renal pelvis  There was no extravasation  After removal of the guidewire the  was used to detach the stent from the pusher  A nice coil was noted in the bladder  The bladder was then drained and the cystoscope removed  The Dangler was cut at the level of the urethral meatus to aid in the stents later removal cystoscopically  Patient tolerated procedure well  He was taken to the recovery room in satisfactory condition    I was present for the entire procedure    Patient Disposition:  PACU     SIGNATURE: Ze Olvera MD  DATE: February 14, 2019  TIME: 8:26 PM

## 2019-02-15 NOTE — DISCHARGE INSTRUCTIONS
Ureteral Stent Placement   WHAT YOU NEED TO KNOW:   Ureteral stent placement is a procedure to open a blocked or narrow ureter  The ureter is the tube that carries urine from your kidney into your bladder  A stent is a thin hollow plastic tube used to hold your ureter open and allow urine to flow  The stent may stay in for several weeks  DISCHARGE INSTRUCTIONS:   Medicines:   · Pain medicine  may be given to take away or decrease pain  Do not wait until the pain is severe before you take your medicine  · Antibiotics  help prevent infections  Your healthcare provider may prescribe these for you while your stent remains in  · Take your medicine as directed  Contact your healthcare provider if you think your medicine is not helping or if you have side effects  Tell him or her if you are allergic to any medicine  Keep a list of the medicines, vitamins, and herbs you take  Include the amounts, and when and why you take them  Bring the list or the pill bottles to follow-up visits  Carry your medicine list with you in case of an emergency  Follow up with your urologist as directed: You will need regular follow-up visits with your urologist as long as the stent remains in  He will check to make sure the stent is working properly  He may do urine cultures to check for infection  Write down your questions so you remember to ask them during your visits  Self-care:   · Drink liquids  as directed  Ask your healthcare provider how much liquid to drink each day and which liquids are best for you  Fluids such as cranberry or apple juice may be especially helpful to prevent urinary infections  · Return to normal activities  the day after your stent placement or as directed by your healthcare provider  · You may take a shower  the day after your stent placement if your healthcare provider says it is okay  Contact your healthcare provider or urologist if:   · You have a fever or chills      · You feel like you need to urinate often  · You have pain when you urinate or pain around your bladder or kidney  · You see blood in your urine or it looks cloudy  · You have questions or concerns about your condition or care  Seek care immediately or call 911 if:   · You urinate little or not at all  · You have severe pain in your abdomen  © 2017 [employer] Information is for End User's use only and may not be sold, redistributed or otherwise used for commercial purposes  All illustrations and images included in CareNotes® are the copyrighted property of A D A M , Inc  or Beni Rangel  The above information is an  only  It is not intended as medical advice for individual conditions or treatments  Talk to your doctor, nurse or pharmacist before following any medical regimen to see if it is safe and effective for you  Ureteroscopy   WHAT YOU NEED TO KNOW:   A ureteroscopy is a procedure to examine in the inside of your urinary tract, which includes your urethra, bladder, ureters, and kidneys  A ureteroscope is a small, thin tube with a light and camera on the end  Ureteroscopy can help your healthcare provider diagnose and treat problems in your urinary tract, such as kidney stones  DISCHARGE INSTRUCTIONS:   Medicine:   · Antibiotics  may be given to treat or prevent an infection  · Take your medicine as directed  Contact your healthcare provider if you think your medicine is not helping or if you have side effects  Tell him or her if you are allergic to any medicine  Keep a list of the medicines, vitamins, and herbs you take  Include the amounts, and when and why you take them  Bring the list or the pill bottles to follow-up visits  Carry your medicine list with you in case of an emergency  Follow up with your healthcare provider as directed:  Write down your questions so you remember to ask them during your visits  Drink liquids as directed    Liquids can help prevent kidney stones and urinary tract infections  Drink water and limit the amount of caffeine you drink  Caffeine may be found in coffee, tea, soda, sports drinks, and foods  Ask your healthcare provider how much liquid to drink each day  Contact your healthcare provider if:   · You have a fever  · You cannot urinate  · You have blood in your urine  · You are vomiting  · You have pain in your abdomen or side  · You have questions or concerns about your condition or care  © 2017 [employer] [facility] Information is for End User's use only and may not be sold, redistributed or otherwise used for commercial purposes  All illustrations and images included in CareNotes® are the copyrighted property of A D A M , Inc  or Beni Claire  The above information is an  only  It is not intended as medical advice for individual conditions or treatments  Talk to your doctor, nurse or pharmacist before following any medical regimen to see if it is safe and effective for you  Ureteral Stent Placement   WHAT YOU NEED TO KNOW:   Ureteral stent placement is a procedure to open a blocked or narrow ureter  The ureter is the tube that carries urine from your kidney into your bladder  A stent is a thin hollow plastic tube used to hold your ureter open and allow urine to flow  The stent may stay in for several weeks  DISCHARGE INSTRUCTIONS:   Medicines:   · Pain medicine  may be given to take away or decrease pain  Do not wait until the pain is severe before you take your medicine  · Antibiotics  help prevent infections  Your healthcare provider may prescribe these for you while your stent remains in  · Take your medicine as directed  Contact your healthcare provider if you think your medicine is not helping or if you have side effects  Tell him or her if you are allergic to any medicine  Keep a list of the medicines, vitamins, and herbs you take   Include the amounts, and when and why you take them  Bring the list or the pill bottles to follow-up visits  Carry your medicine list with you in case of an emergency  Follow up with your urologist as directed: You will need regular follow-up visits with your urologist as long as the stent remains in  He will check to make sure the stent is working properly  He may do urine cultures to check for infection  Write down your questions so you remember to ask them during your visits  Self-care:   · Drink liquids  as directed  Ask your healthcare provider how much liquid to drink each day and which liquids are best for you  Fluids such as cranberry or apple juice may be especially helpful to prevent urinary infections  · Return to normal activities  the day after your stent placement or as directed by your healthcare provider  · You may take a shower  the day after your stent placement if your healthcare provider says it is okay  Contact your healthcare provider or urologist if:   · You have a fever or chills  · You feel like you need to urinate often  · You have pain when you urinate or pain around your bladder or kidney  · You see blood in your urine or it looks cloudy  · You have questions or concerns about your condition or care  Seek care immediately or call 911 if:   · You urinate little or not at all  · You have severe pain in your abdomen  © 2017 [employer] Information is for End User's use only and may not be sold, redistributed or otherwise used for commercial purposes  All illustrations and images included in CareNotes® are the copyrighted property of A [employer] M [employer] Inc  or Beni Rangel  The above information is an  only  It is not intended as medical advice for individual conditions or treatments  Talk to your doctor, nurse or pharmacist before following any medical regimen to see if it is safe and effective for you

## 2019-02-15 NOTE — NURSING NOTE
Ambulated to bathroom with supervision  Voided blood tinged urine  Feels like he emptied out  Denies need for pain medication  Steady gait  IV removed  Discharge instructions given

## 2019-02-15 NOTE — DISCHARGE SUMMARY
DISCHARGE SUMMARY     Patient Name: Trey Lopez    Patient MRN: [id number]    Admitting Provider: Tete Rodriguez MD    Discharging Provider: Tete Rodriguez MD    Primary Care Physician at Discharge: Unique Dee [phone]     Admission Date: 2/14/2019     Discharge Date: 2/14/2019    Admission Diagnosis   Ureteral calculi [N20 1]    Discharge Diagnoses  Principal Problem:    Ureteral calculi      Medications  Current Discharge Medication List         Current Discharge Medication List      CONTINUE these medications which have NOT CHANGED    Details   naproxen (NAPROSYN) 500 mg tablet Take 1 tablet (500 mg total) by mouth 2 (two) times a day with meals  Qty: 30 tablet, Refills: 0    Associated Diagnoses: Right kidney stone      simvastatin (ZOCOR) 20 mg tablet Take 1 tablet (20 mg total) by mouth daily  Qty: 90 tablet, Refills: 1    Associated Diagnoses: Hypercholesterolemia      Omega-3 Fatty Acids (FISH OIL) 1000 MG CPDR Take by mouth daily  Refills: 0    Associated Diagnoses: Hypercholesterolemia             Allergies  No Known Allergies    Outpatient Follow-Up  Tete Rodriguez MD  [facility] [address]  [phone]    Schedule an appointment as soon as possible for a visit in 2 week(s)  Please call the office to arrange a cystoscopy and stent removal for 2 weeks  ? Discharge Disposition  Home    Operative Procedures Performed  Procedure(s):  CYSTOSCOPY URETEROSCOPY WITH LITHOTRIPSY HOLMIUM LASER, RETROGRADE PYELOGRAM AND INSERTION STENT URETERAL, stone basket extraction  ? Physical Exam at Discharge  Condition of Patient on Discharge: stable  ?   Tete Rodriguez MD

## 2019-02-15 NOTE — TELEPHONE ENCOUNTER
Per Dr Elvie Nova discharge summary pt to return in 2 weeks for a cysto stent removal  Pt was scheduled for 03/04/19

## 2019-02-20 LAB
CA PHOS MFR STONE: 2 %
CALCIUM OXALATE DIHYDRATE MFR STONE IR: 2 %
COLOR STONE: NORMAL
COM MFR STONE: 96 %
COMMENT-STONE3: NORMAL
COMPOSITION: NORMAL
LABORATORY COMMENT REPORT: NORMAL
NIDUS STONE QL: NORMAL
PHOTO: NORMAL
SIZE STONE: NORMAL MM
STONE ANALYSIS-IMP: NORMAL
STONE ANALYSIS-IMP: NORMAL
WT STONE: 6.5 MG

## 2019-02-21 ENCOUNTER — TELEPHONE (OUTPATIENT)
Dept: UROLOGY | Facility: MEDICAL CENTER | Age: 54
End: 2019-02-21

## 2019-02-21 NOTE — TELEPHONE ENCOUNTER
Pt notified that he is ok to light exercise to his own limits that he could notice blood due to stent and activity

## 2019-02-21 NOTE — TELEPHONE ENCOUNTER
Patient had procedure on 02/14/2019 and would like to know how long with his restrictions would he wait for physical fitness and lifting restrictions  Please advise

## 2019-03-04 ENCOUNTER — PROCEDURE VISIT (OUTPATIENT)
Dept: UROLOGY | Facility: MEDICAL CENTER | Age: 54
End: 2019-03-04
Payer: COMMERCIAL

## 2019-03-04 VITALS
HEART RATE: 70 BPM | BODY MASS INDEX: 26.51 KG/M2 | HEIGHT: 69 IN | DIASTOLIC BLOOD PRESSURE: 80 MMHG | SYSTOLIC BLOOD PRESSURE: 120 MMHG | WEIGHT: 179 LBS

## 2019-03-04 DIAGNOSIS — N20.1 URETERAL CALCULUS, RIGHT: Primary | ICD-10-CM

## 2019-03-04 PROCEDURE — 52310 CYSTOSCOPY AND TREATMENT: CPT | Performed by: UROLOGY

## 2019-03-04 NOTE — LETTER
March 4, 2019     Brandon Flanagan MD  [address] [place] [address]    Patient: Ha Coleman   YOB: 1965   Date of Visit: 3/4/2019       Dear Dr Adriana Rodriguez: Thank you for referring Homero Lai to me for evaluation  Below are my notes for this consultation  If you have questions, please do not hesitate to call me  I look forward to following your patient along with you  Sincerely,        Ferrel Boxer, MD        CC: No Recipients  Ferrel Boxer, MD  3/4/2019  4:56 PM  Sign at close encounter  Cystoscopy  Date/Time: 3/4/2019 4:49 PM  Performed by: Ferrel Boxer, MD  Authorized by: Ferrel Boxer, MD     Procedure details: simple removal of a foreign body, stone, or stent    Additional Procedure Details: Indication:  Retained right ureteral stent      Procedure: The patient was brought to the procedure room and properly identified  He was then prepped and draped in the usual sterile fashion 2% xylocaine jelly administered to the urethra  Flexible cystoscopy was then performed  The stent string was then grasped with the grasper and the stent completely removed  The procedure was well tolerated  The patient dressed and was given discharge instructions  Stone analysis reveals the stone me primarily calcium oxalate monohydrate  We will plan to obtain a renal ultrasound in 6 weeks  He will do 24 hour urine testing and return in approximately 3 months for follow-up  He was instructed to call for fever, severe pain or heavy urinary bleeding

## 2019-03-04 NOTE — LETTER
March 4, 2019     Haley Wills MD  [address] [place] [address]    Patient: America Lr   YOB: 1965   Date of Visit: 3/4/2019       Dear Dr Janny Perkins: Thank you for referring Melanie Light to me for evaluation  Below are my notes for this consultation  If you have questions, please do not hesitate to call me  I look forward to following your patient along with you  Sincerely,        Jacques Sandhu MD        CC: YUMIKO Salazar MD  3/4/2019  4:56 PM  Sign at close encounter  Cystoscopy  Date/Time: 3/4/2019 4:49 PM  Performed by: Jacques Sandhu MD  Authorized by: Jacques Sandhu MD     Procedure details: simple removal of a foreign body, stone, or stent    Additional Procedure Details: Indication:  Retained right ureteral stent      Procedure: The patient was brought to the procedure room and properly identified  He was then prepped and draped in the usual sterile fashion 2% xylocaine jelly administered to the urethra  Flexible cystoscopy was then performed  The stent string was then grasped with the grasper and the stent completely removed  The procedure was well tolerated  The patient dressed and was given discharge instructions  Stone analysis reveals the stone me primarily calcium oxalate monohydrate  We will plan to obtain a renal ultrasound in 6 weeks  He will do 24 hour urine testing and return in approximately 3 months for follow-up  He was instructed to call for fever, severe pain or heavy urinary bleeding

## 2019-03-04 NOTE — PROGRESS NOTES
Cystoscopy  Date/Time: 3/4/2019 4:49 PM  Performed by: Araceli Loza MD  Authorized by: Araceli Loza MD     Procedure details: simple removal of a foreign body, stone, or stent    Additional Procedure Details: Indication:  Retained right ureteral stent      Procedure: The patient was brought to the procedure room and properly identified  He was then prepped and draped in the usual sterile fashion 2% xylocaine jelly administered to the urethra  Flexible cystoscopy was then performed  The stent string was then grasped with the grasper and the stent completely removed  The procedure was well tolerated  The patient dressed and was given discharge instructions  Stone analysis reveals the stone me primarily calcium oxalate monohydrate  We will plan to obtain a renal ultrasound in 6 weeks  He will do 24 hour urine testing and return in approximately 3 months for follow-up  He was instructed to call for fever, severe pain or heavy urinary bleeding

## 2019-03-04 NOTE — PATIENT INSTRUCTIONS

## 2019-04-18 ENCOUNTER — HOSPITAL ENCOUNTER (OUTPATIENT)
Dept: RADIOLOGY | Age: 54
Discharge: HOME | End: 2019-04-18
Payer: COMMERCIAL

## 2019-04-18 DIAGNOSIS — N20.1 URETERAL CALCULUS, RIGHT: ICD-10-CM

## 2019-04-18 PROCEDURE — 76770 US EXAM ABDO BACK WALL COMP: CPT

## 2019-04-29 ENCOUNTER — TELEPHONE (OUTPATIENT)
Dept: UROLOGY | Facility: MEDICAL CENTER | Age: 54
End: 2019-04-29

## 2019-05-01 ENCOUNTER — TELEPHONE (OUTPATIENT)
Dept: UROLOGY | Facility: MEDICAL CENTER | Age: 54
End: 2019-05-01

## 2019-06-03 ENCOUNTER — OFFICE VISIT (OUTPATIENT)
Dept: UROLOGY | Facility: CLINIC | Age: 54
End: 2019-06-03
Payer: COMMERCIAL

## 2019-06-03 VITALS
BODY MASS INDEX: 26.81 KG/M2 | WEIGHT: 181 LBS | DIASTOLIC BLOOD PRESSURE: 76 MMHG | HEART RATE: 69 BPM | HEIGHT: 69 IN | SYSTOLIC BLOOD PRESSURE: 110 MMHG

## 2019-06-03 DIAGNOSIS — N20.0 CALCULUS OF KIDNEY: Primary | ICD-10-CM

## 2019-06-03 PROCEDURE — 99213 OFFICE O/P EST LOW 20 MIN: CPT | Performed by: PHYSICIAN ASSISTANT

## 2019-06-15 ENCOUNTER — APPOINTMENT (OUTPATIENT)
Dept: LAB | Facility: MEDICAL CENTER | Age: 54
End: 2019-06-15
Payer: COMMERCIAL

## 2019-06-15 DIAGNOSIS — N40.1 BENIGN PROSTATIC HYPERPLASIA WITH LOWER URINARY TRACT SYMPTOMS, SYMPTOM DETAILS UNSPECIFIED: ICD-10-CM

## 2019-06-15 DIAGNOSIS — Z11.59 NEED FOR HEPATITIS C SCREENING TEST: ICD-10-CM

## 2019-06-15 DIAGNOSIS — E78.00 HYPERCHOLESTEROLEMIA: ICD-10-CM

## 2019-06-15 LAB
CHOLEST SERPL-MCNC: 162 MG/DL (ref 50–200)
HDLC SERPL-MCNC: 36 MG/DL (ref 40–60)
LDLC SERPL CALC-MCNC: 96 MG/DL (ref 0–100)
NONHDLC SERPL-MCNC: 126 MG/DL
PSA SERPL-MCNC: 0.5 NG/ML (ref 0–4)
TRIGL SERPL-MCNC: 149 MG/DL

## 2019-06-15 PROCEDURE — 84153 ASSAY OF PSA TOTAL: CPT

## 2019-06-15 PROCEDURE — 36415 COLL VENOUS BLD VENIPUNCTURE: CPT

## 2019-06-15 PROCEDURE — 86803 HEPATITIS C AB TEST: CPT

## 2019-06-15 PROCEDURE — 80061 LIPID PANEL: CPT

## 2019-06-16 LAB — HCV AB SER QL: NORMAL

## 2019-06-28 ENCOUNTER — OFFICE VISIT (OUTPATIENT)
Dept: INTERNAL MEDICINE CLINIC | Facility: CLINIC | Age: 54
End: 2019-06-28
Payer: COMMERCIAL

## 2019-06-28 VITALS
SYSTOLIC BLOOD PRESSURE: 122 MMHG | HEART RATE: 77 BPM | TEMPERATURE: 98.2 F | DIASTOLIC BLOOD PRESSURE: 84 MMHG | WEIGHT: 182.2 LBS | OXYGEN SATURATION: 98 % | BODY MASS INDEX: 26.98 KG/M2 | HEIGHT: 69 IN

## 2019-06-28 DIAGNOSIS — E78.00 HYPERCHOLESTEROLEMIA: ICD-10-CM

## 2019-06-28 DIAGNOSIS — R53.83 OTHER FATIGUE: ICD-10-CM

## 2019-06-28 DIAGNOSIS — M25.511 ACUTE PAIN OF RIGHT SHOULDER: ICD-10-CM

## 2019-06-28 DIAGNOSIS — E03.8 SUBCLINICAL HYPOTHYROIDISM: ICD-10-CM

## 2019-06-28 DIAGNOSIS — R06.81 WITNESSED EPISODE OF APNEA: ICD-10-CM

## 2019-06-28 DIAGNOSIS — Z23 NEED FOR DIPHTHERIA-TETANUS-PERTUSSIS (TDAP) VACCINE: Primary | ICD-10-CM

## 2019-06-28 PROBLEM — Z11.59 NEED FOR HEPATITIS C SCREENING TEST: Status: RESOLVED | Noted: 2018-12-07 | Resolved: 2019-06-28

## 2019-06-28 PROBLEM — R06.02 SHORTNESS OF BREATH: Status: RESOLVED | Noted: 2017-06-05 | Resolved: 2019-06-28

## 2019-06-28 PROCEDURE — 99214 OFFICE O/P EST MOD 30 MIN: CPT | Performed by: NURSE PRACTITIONER

## 2019-06-28 PROCEDURE — 3008F BODY MASS INDEX DOCD: CPT | Performed by: NURSE PRACTITIONER

## 2019-06-28 PROCEDURE — 90471 IMMUNIZATION ADMIN: CPT

## 2019-06-28 PROCEDURE — 90715 TDAP VACCINE 7 YRS/> IM: CPT

## 2019-06-28 PROCEDURE — 1036F TOBACCO NON-USER: CPT | Performed by: NURSE PRACTITIONER

## 2019-06-28 RX ORDER — SIMVASTATIN 40 MG
40 TABLET ORAL DAILY
Qty: 90 TABLET | Refills: 1 | Status: SHIPPED | OUTPATIENT
Start: 2019-06-28 | End: 2019-07-02 | Stop reason: SDUPTHER

## 2019-06-28 RX ORDER — SIMVASTATIN 20 MG
40 TABLET ORAL DAILY
Qty: 90 TABLET | Refills: 1 | Status: SHIPPED | OUTPATIENT
Start: 2019-06-28 | End: 2019-06-28 | Stop reason: SDUPTHER

## 2019-07-02 ENCOUNTER — TELEPHONE (OUTPATIENT)
Dept: INTERNAL MEDICINE CLINIC | Age: 54
End: 2019-07-02

## 2019-07-02 DIAGNOSIS — E78.00 HYPERCHOLESTEROLEMIA: ICD-10-CM

## 2019-07-02 RX ORDER — SIMVASTATIN 20 MG
20 TABLET ORAL DAILY
Qty: 90 TABLET | Refills: 1 | Status: SHIPPED | OUTPATIENT
Start: 2019-07-02 | End: 2020-02-07 | Stop reason: SDUPTHER

## 2019-07-02 NOTE — TELEPHONE ENCOUNTER
Pt called  He was confused with his simvastatin dosing  He reviewed and is only taking 20mg  Lipids are controlled  Pt to continue 20mg daily  New rx sent to pharm  Called and reviewed with pharm as well to ensure correct dosing filled  No questions  Med list updated

## 2019-07-03 ENCOUNTER — APPOINTMENT (OUTPATIENT)
Dept: RADIOLOGY | Age: 54
End: 2019-07-03
Payer: COMMERCIAL

## 2019-07-03 DIAGNOSIS — M25.511 ACUTE PAIN OF RIGHT SHOULDER: ICD-10-CM

## 2019-07-03 PROCEDURE — 73030 X-RAY EXAM OF SHOULDER: CPT

## 2019-07-11 ENCOUNTER — EVALUATION (OUTPATIENT)
Dept: PHYSICAL THERAPY | Age: 54
End: 2019-07-11
Payer: COMMERCIAL

## 2019-07-11 DIAGNOSIS — M25.511 ACUTE PAIN OF RIGHT SHOULDER: Primary | ICD-10-CM

## 2019-07-11 PROCEDURE — 97161 PT EVAL LOW COMPLEX 20 MIN: CPT | Performed by: PHYSICAL THERAPIST

## 2019-07-11 PROCEDURE — 97110 THERAPEUTIC EXERCISES: CPT | Performed by: PHYSICAL THERAPIST

## 2019-07-11 NOTE — PROGRESS NOTES
PT Evaluation     Today's date: 2019  Patient name: Naman Mims  : 1965  MRN: [id number]  Referring provider: JESIKA Carvajal  Dx:   Encounter Diagnosis     ICD-10-CM    1  Acute pain of right shoulder M25 511 Ambulatory referral to Physical Therapy       Start Time:   Stop Time: 1715  Total time in clinic (min): 40 minutes    Assessment  Assessment details: Naman Mims is a 46 y o  male who presents with complaints of Acute pain of right shoulder  Testing inconclusive for determining true diagnosis as multiple testing reproduced deep internal shoulder discomfort  Potential for labral pathology exists  Prognosis is fair to good given HEP compliance and PT 2-3x/wk  Patient presents with limitations in sleeping and reaching overhead  Positive prognostic indicators include positive attitude toward recovery  Please contact me if you have any questions or recommendations  Thank you for the opportunity to share in Artie's care       Impairments: abnormal muscle tone, abnormal or restricted ROM, abnormal movement, impaired physical strength, lacks appropriate home exercise program, pain with function and poor posture   Understanding of Dx/Px/POC: good   Prognosis: good    Plan  Patient would benefit from: skilled physical therapy  Planned modality interventions: thermotherapy: hydrocollator packs and electrical stimulation/Russian stimulation  Planned therapy interventions: joint mobilization, manual therapy, patient education, postural training, strengthening, stretching, therapeutic activities, therapeutic exercise, home exercise program, neuromuscular re-education and flexibility  Frequency: 2x week  Plan of Care beginning date: 2019  Plan of Care expiration date: 2019  Treatment plan discussed with: patient        Subjective Evaluation    History of Present Illness  Mechanism of injury: Patient reports today stating that his right shoulder pain began in January that presents itself while he is sleeping and is stiff throughout the day  Pt reports that pain is worse when he sleeps on his back with less numbness and tingling located in his shoulder  Pt states that over the course of the day his arm becomes more weak and feels a decrease in sensation from the shoulder to above the elbow  Pt states that in the morning that he continues to have difficulty reaching overhead but gradually gets better  Pt denies pain while riding a motor cycle and paresthesia in fingers  Pt states that he feels tension in his shoulders that results in neck stiffness  Patient is right hand dominant  Pain  Current pain ratin  At worst pain ratin  Quality: dull ache and throbbing  Relieving factors: change in position  Aggravating factors: overhead activity (sleeping, time dependent, reaching overhead)  Progression: worsening    Hand dominance: right      Diagnostic Tests  X-ray: normal  Patient Goals  Patient goals for therapy: decreased pain and increased strength          Objective  Flowsheet Rows      Most Recent Value   PT/OT G-Codes   Current Score  53   Projected Score  70         Cervical % of normal   Flex  100   Extn  100   SB Left 75   SB Right 75   ROT Left 100   ROT Right 75         MMT         AROM          PROM    Shoulder       L       R        L           R      L     R   Flex  5 5 WNL WNL ERP- right scap WNL 160p! Extn  5 5 50 35 WNL    Abd  5 5 WNL WNL WNL 100p! IR 5 5   WNL WNL   Behind back IR   WNL WNL-pain scap WNL WNL   ER  5 5p! 80 70 100 90            Mid trap 5 4-       Low trap 5 4-       Serratus 5 3+                     MMT    Elbow         L        R   Flex  5 5   Extn  5 5     Head positioning:  Forward head posture, rounded shoulders  Palpation: Right subscap  Scapular positioning: early dumping  Scapular assistance test: + improved scapular strength  Dermatome: (pinprick- L/R): intact BUE      Reflexes:  (L/R) C5:        C6:            C7:             Vertebral artery test=  N/T sharp malissa test=   N/T supine transverse ligament=  N/T Alar ligament= N/T  Spurlings= N/T   Lateral flexion/rotation test = -        Cervical joint mobility: Limited C5    Thoracic joint mobility: limited T1-3    Rib mobility: WNL    ULTTs: see following                      1   Radial nerve :  L =  -     , R =    -                      2   Ulnar nerve :    L =   -    , R =    +                                              3   Median Nerve: L =    -   , R =    -    Shoulder: painful arc sign= -   apprehension test= +   Anterior drawer test = +   Lawrence test= +  biceps load= + NEERs= - Schroeder/Henok test = + pain   Full can test= + pain Speed's test= + pain  Infraspinatus test= + pain    passive compression test= + belly press= decreased strength B/L  ER lag= - Crank test= + pain deep inside shoulder    Shoulder joint mobility: hypomobile GHJ  AC/SC joints: WNL    Precautions: none  Manual                                                                  Exercise Diary 7 11       Shld IR/ER 3x10 YTB       Prone shld extn 3x10, 3"       Wall slide 2x10, 3"                                                           Modalities                          Patient provided verbal consent to treatment plan and recommended interventions  Short Term:  1  Pt will report decreased levels of pain by at least 2 subjective ratings in 4 weeks  2  Pt will demonstrate improved ROM by at least 10 degrees in 4 weeks  3  Pt will demonstrate improved strength by 1/2 grade in 4 weeks  4  Pt will be able to reach above head in morning without difficulty in 4 weeks  Long Term:   1  Pt will be independent in their HEP in 6 weeks  2  Pt will be pain free with IADL's 6 weeks  3  Pt will demonstrate improved FOTO, > 17 in 6 weeks  4  Pt will be able to perform full shoulder motion in 6 weeks without pain

## 2019-07-16 ENCOUNTER — OFFICE VISIT (OUTPATIENT)
Dept: PHYSICAL THERAPY | Age: 54
End: 2019-07-16
Payer: COMMERCIAL

## 2019-07-16 DIAGNOSIS — M25.511 ACUTE PAIN OF RIGHT SHOULDER: Primary | ICD-10-CM

## 2019-07-16 PROCEDURE — 97110 THERAPEUTIC EXERCISES: CPT | Performed by: PHYSICAL THERAPIST

## 2019-07-16 PROCEDURE — 97112 NEUROMUSCULAR REEDUCATION: CPT | Performed by: PHYSICAL THERAPIST

## 2019-07-16 NOTE — PROGRESS NOTES
Daily Note     Today's date: 2019  Patient name: Jazmine Horner  : 1965  MRN: [id number]  Referring provider: JESIKA Reynolds  Dx:   Encounter Diagnosis     ICD-10-CM    1  Acute pain of right shoulder M25 511      Start Time: 1615  Stop Time: 1705  Total time in clinic (min): 50 minutes    Subjective: Pt presents today stating that he has been feeling much better overall and not feeling as though his arm is "dead" when waking  Reports no longer having pain in the morning or with sleeping  Objective: See treatment diary below      Assessment: Tolerated treatment well  Patient required moderate cueing for correct exercise performance and muscle activation  Patient demonstrates improved shoulder motion and control as well as reduced level of irritability         Plan: Continue per plan of care       Precautions: none  Manual                                                                                                                  Exercise Diary 7 11  7 16         Shld IR/ER 3x10 YTB  3x10 YTB          Prone shld extn 3x10, 3"  HEP         Wall slide 2x10, 3" 3x10         scaption   3x10, 1#         rows   3x10 GTB          face pulls    3x10 YTB          no money   3x10 YTB          wall clocks   2 ea CW/CCW                        * time also includes assessment     Modalities

## 2019-07-18 ENCOUNTER — OFFICE VISIT (OUTPATIENT)
Dept: PHYSICAL THERAPY | Age: 54
End: 2019-07-18
Payer: COMMERCIAL

## 2019-07-18 DIAGNOSIS — M25.511 ACUTE PAIN OF RIGHT SHOULDER: Primary | ICD-10-CM

## 2019-07-18 PROCEDURE — 97110 THERAPEUTIC EXERCISES: CPT

## 2019-07-18 PROCEDURE — 97112 NEUROMUSCULAR REEDUCATION: CPT

## 2019-07-18 NOTE — PROGRESS NOTES
Daily Note     Today's date: 2019  Patient name: Bob Maradiaga  : 1965  MRN: [id number]  Referring provider: Cooper Dance, CRNP  Dx:   Encounter Diagnosis     ICD-10-CM    1  Acute pain of right shoulder M25 511                   Subjective:  Pt reports increased muscle soreness after last visit but not joint pain, pt reports pain is decreasing with improving motion      Objective: See treatment diary below      Assessment:  Ex progressions challenging but shaheed well, cueing at times for scapular stabilization pt tends to elevate scapula during strengthening ex      Plan: Continue per plan of care  Progress treatment as tolerated             Precautions: none  Manual                                                                                                                  Exercise Diary 7 11  7 16  19       Shld IR/ER 3x10 YTB  3x10 YTB  Rtb/ytb 3x10 ea       Prone shld extn 3x10, 3"  HEP         Wall slide 2x10, 3" 3x10  3x10       scaption   3x10, 1#  1# 3x10       rows   3x10 GTB  btb 3x10        face pulls    3x10 YTB  ytb 3x10        no money   3x10 YTB  ytb 3x10        wall clocks   2 ea CW/CCW  3x10 ea        UBE      5' rev        * time also includes assessment     Modalities

## 2019-07-22 ENCOUNTER — OFFICE VISIT (OUTPATIENT)
Dept: PHYSICAL THERAPY | Age: 54
End: 2019-07-22
Payer: COMMERCIAL

## 2019-07-22 DIAGNOSIS — M25.511 ACUTE PAIN OF RIGHT SHOULDER: Primary | ICD-10-CM

## 2019-07-22 PROCEDURE — 97110 THERAPEUTIC EXERCISES: CPT | Performed by: PHYSICAL THERAPIST

## 2019-07-22 PROCEDURE — 97112 NEUROMUSCULAR REEDUCATION: CPT | Performed by: PHYSICAL THERAPIST

## 2019-07-22 PROCEDURE — 97140 MANUAL THERAPY 1/> REGIONS: CPT | Performed by: PHYSICAL THERAPIST

## 2019-07-22 NOTE — PROGRESS NOTES
Daily Note     Today's date: 2019  Patient name: Wes Humphrey  : 1965  MRN: [id number]  Referring provider: JESIKA Rader  Dx:   Encounter Diagnosis     ICD-10-CM    1  Acute pain of right shoulder M25 511        Start Time: 161  Stop Time: 1705  Total time in clinic (min): 50 minutes    Subjective:  Pt reports that his shoulder is feeling much better  He states that yard work and ADLs are becoming less difficult  He feels that his overall strength lifting overhead has improved with minimal to nil symptoms  Objective: See treatment diary below      Assessment:  Patient reported fatigue with progression in strengthening to scapular stabilizers  Plan: Continue per plan of care  Progress treatment as tolerated  Precautions: none  Manual  7             D2 PNF 2x10                                                                                                  Exercise Diary 7 19      Shld IR/ER 3x10 YTB  3x10 YTB  Rtb/ytb 3x10 ea  Rtb/ytb 3x10 ea      Prone shld extn 3x10, 3"  HEP          Wall slide 2x10, 3" 3x10  3x10        scaption   3x10, 1#  1# 3x10  2#, 3x10      rows   3x10 GTB  btb 3x10  btb 3x10       face pulls    3x10 YTB  ytb 3x10  ytb 3x10       no money   3x10 YTB  ytb 3x10         wall clocks   2 ea CW/CCW  3x10 ea  2ea   CW/CCW       UBE      5' rev  5' rev      PNF D1, D2    2x10- YTB     BOSU push up against low mat    2x10     S/L serratus punch    2x10, 5"                        * time also includes assessment     Modalities

## 2019-07-25 ENCOUNTER — OFFICE VISIT (OUTPATIENT)
Dept: PHYSICAL THERAPY | Age: 54
End: 2019-07-25
Payer: COMMERCIAL

## 2019-07-25 DIAGNOSIS — M25.511 ACUTE PAIN OF RIGHT SHOULDER: Primary | ICD-10-CM

## 2019-07-25 PROCEDURE — 97112 NEUROMUSCULAR REEDUCATION: CPT

## 2019-07-25 PROCEDURE — 97140 MANUAL THERAPY 1/> REGIONS: CPT

## 2019-07-25 PROCEDURE — 97110 THERAPEUTIC EXERCISES: CPT

## 2019-07-25 NOTE — PROGRESS NOTES
Daily Note     Today's date: 2019  Patient name: Jazmine Horner  : 1965  MRN: [id number]  Referring provider: JESIKA Reynolds  Dx:   Encounter Diagnosis     ICD-10-CM    1  Acute pain of right shoulder M25 511                   Subjective:  "overall feels better, but sore from the push up ex"    Objective: See treatment diary below      Assessment: postural cueing throughout, instructed pt on use of lumbar roll during work for postural correction, added ER ex to improve stabilization      Plan: Continue per plan of care  Progress treatment as tolerated  Precautions: none  Manual  7 19          D2 PNF 2x10  2x10          rhythmic stab with ER    ytb 2x6                                                                                  Exercise Diary 7 19    Shld IR/ER 3x10 YTB  3x10 YTB  Rtb/ytb 3x10 ea  Rtb/ytb 3x10 ea      Prone shld extn 3x10, 3"  HEP          Wall slide 2x10, 3" 3x10  3x10        scaption   3x10, 1#  1# 3x10  2#, 3x10  2# 3x10    rows   3x10 GTB  btb 3x10  btb 3x10  blk 3x10     face pulls    3x10 YTB  ytb 3x10  ytb 3x10  rtb 3x10     no money   3x10 YTB  ytb 3x10         wall clocks   2 ea CW/CCW  3x10 ea  2ea   CW/CCW  1 min each cw/ccw     UBE      5' rev  5' rev  5' rev     PNF D1, D2    2x10- YTB Rtb 3x10 ea    BOSU push up against low mat    2x10 3x10    S/L serratus punch    2x10, 5" 3x10x5"    bodyblade     Add/abd with elbow ext 4x15"              * time also includes assessment     Modalities

## 2019-07-29 ENCOUNTER — OFFICE VISIT (OUTPATIENT)
Dept: PHYSICAL THERAPY | Age: 54
End: 2019-07-29
Payer: COMMERCIAL

## 2019-07-29 DIAGNOSIS — M25.511 ACUTE PAIN OF RIGHT SHOULDER: Primary | ICD-10-CM

## 2019-07-29 PROCEDURE — 97140 MANUAL THERAPY 1/> REGIONS: CPT

## 2019-07-29 PROCEDURE — 97112 NEUROMUSCULAR REEDUCATION: CPT

## 2019-07-29 PROCEDURE — 97110 THERAPEUTIC EXERCISES: CPT

## 2019-07-29 NOTE — PROGRESS NOTES
Daily Note     Today's date: 2019  Patient name: Scotty Carlton  : 1965  MRN: [id number]  Referring provider: JESIKA Randle  Dx:   Encounter Diagnosis     ICD-10-CM    1  Acute pain of right shoulder M25 511                   Subjective: pt reports LB discomfort at times during ex     Objective: See treatment diary below      Assessment: serratus anterior weakness noted,compensation noted during strengthening ex, less LB pain noted with cueing for abdominal bracing during ex,    Plan: Continue per plan of care  Progress treatment as tolerated  Precautions: none  Manual  7 19        D2 PNF 2x10  2x10          rhythmic stab with ER    ytb 2x6 VK        rhtythmic stab during PNF D 2       VK                                                                  Exercise Diary 7 19   Shld IR/ER 3x10 YTB  3x10 YTB  Rtb/ytb 3x10 ea  Rtb/ytb 3x10 ea   Gtb/rtb 3x10   Prone shld extn 3x10, 3"  HEP          Wall slide 2x10, 3" 3x10  3x10        scaption   3x10, 1#  1# 3x10  2#, 3x10  2# 3x10 2# 3x10   rows   3x10 GTB  btb 3x10  btb 3x10  blk 3x10 blk 3x10    face pulls    3x10 YTB  ytb 3x10  ytb 3x10  rtb 3x10 rtb 3x10    no money   3x10 YTB  ytb 3x10         wall clocks   2 ea CW/CCW  3x10 ea  2ea   CW/CCW  1 min each cw/ccw 1min ea cw/ccw    UBE      5' rev  5' rev  5' rev  5' rev   PNF D1, D2    2x10- YTB Rtb 3x10 ea rtb 3x10   BOSU push up against low mat    2x10 3x10 3x10   S/L serratus punch    2x10, 5" 3x10x5" 3x10   bodyblade     Add/abd with elbow ext 4x15" Abd/add with elbow ext 5x20"   Bear hug with TB      Red 3x10    * time also includes assessment     Modalities

## 2019-08-01 ENCOUNTER — OFFICE VISIT (OUTPATIENT)
Dept: PHYSICAL THERAPY | Age: 54
End: 2019-08-01
Payer: COMMERCIAL

## 2019-08-01 DIAGNOSIS — M25.511 ACUTE PAIN OF RIGHT SHOULDER: Primary | ICD-10-CM

## 2019-08-01 PROCEDURE — 97014 ELECTRIC STIMULATION THERAPY: CPT

## 2019-08-01 PROCEDURE — 97112 NEUROMUSCULAR REEDUCATION: CPT

## 2019-08-01 PROCEDURE — 97110 THERAPEUTIC EXERCISES: CPT

## 2019-08-01 NOTE — PROGRESS NOTES
Daily Note     Today's date: 2019  Patient name: Margy Jang  : 1965  MRN: [id number]  Referring provider: JESIKA Torres  Dx:   Encounter Diagnosis     ICD-10-CM    1  Acute pain of right shoulder M25 511                   Subjective:  Pt reports difficulty doing HEP due to work hours      Objective: See treatment diary below      Assessment: added NMES for serratus anterior muscle re ed/strengthening , pt performed in sitting with cueing for technique, pt needed cueing for technique as fatigue progressed,    Plan:  Continue per plan of care  Progress treatment as tolerated  Precautions: none  Manual  7 19        D2 PNF 2x10  2x10          rhythmic stab with ER    ytb 2x6 VK        rhtythmic stab during PNF D 2       VK                                                                  Exercise Diary  7 19   Shld IR/ER  3x10 YTB  Rtb/ytb 3x10 ea  Rtb/ytb 3x10 ea   Gtb/rtb 3x10 Gtb/rtb 3x10 ea   Prone shld extn  HEP           Wall slide 3x10  3x10         scaption 3x10, 1#  1# 3x10  2#, 3x10  2# 3x10 2# 3x10 np   rows 3x10 GTB  btb 3x10  btb 3x10  blk 3x10 blk 3x10 blk 3x10    face pulls  3x10 YTB  ytb 3x10  ytb 3x10  rtb 3x10 rtb 3x10 rtb 3x10    no money 3x10 YTB  ytb 3x10          wall clocks 2 ea CW/CCW  3x10 ea  2ea   CW/CCW  1 min each cw/ccw 1min ea cw/ccw 1 min ea cw/ccw    UBE    5' rev  5' rev  5' rev  5' rev 5' rev   PNF D1, D2   2x10- YTB Rtb 3x10 ea rtb 3x10 rtb 3x10 ea   BOSU push up against low mat   2x10 3x10 3x10 3x10   S/L serratus punch   2x10, 5" 3x10x5" 3x10 np   bodyblade    Add/abd with elbow ext 4x15" Abd/add with elbow ext 5x20" 5x20" add/abd with elbow ext   Bear hug with TB     Red 3x10 rtb 3x10    * time also includes assessment     Modalities  19            NMES with R serratus  10 ' 10" on/10" off                            1:1 treatment  6-630

## 2019-08-05 ENCOUNTER — OFFICE VISIT (OUTPATIENT)
Dept: URGENT CARE | Facility: CLINIC | Age: 54
End: 2019-08-05
Payer: COMMERCIAL

## 2019-08-05 ENCOUNTER — APPOINTMENT (OUTPATIENT)
Dept: PHYSICAL THERAPY | Age: 54
End: 2019-08-05
Payer: COMMERCIAL

## 2019-08-05 VITALS
WEIGHT: 181 LBS | HEIGHT: 70 IN | RESPIRATION RATE: 16 BRPM | TEMPERATURE: 98 F | OXYGEN SATURATION: 97 % | SYSTOLIC BLOOD PRESSURE: 114 MMHG | BODY MASS INDEX: 25.91 KG/M2 | DIASTOLIC BLOOD PRESSURE: 84 MMHG | HEART RATE: 79 BPM

## 2019-08-05 DIAGNOSIS — T63.461A WASP STING, ACCIDENTAL OR UNINTENTIONAL, INITIAL ENCOUNTER: Primary | ICD-10-CM

## 2019-08-05 DIAGNOSIS — M79.89 PAIN AND SWELLING OF FOREARM, RIGHT: ICD-10-CM

## 2019-08-05 DIAGNOSIS — M79.631 PAIN AND SWELLING OF FOREARM, RIGHT: ICD-10-CM

## 2019-08-05 PROCEDURE — G0382 LEV 3 HOSP TYPE B ED VISIT: HCPCS | Performed by: PHYSICIAN ASSISTANT

## 2019-08-05 RX ORDER — PREDNISONE 10 MG/1
TABLET ORAL
Qty: 21 TABLET | Refills: 0 | Status: SHIPPED | OUTPATIENT
Start: 2019-08-05 | End: 2019-10-24 | Stop reason: HOSPADM

## 2019-08-05 NOTE — PROGRESS NOTES
NAME: Michael Oconnor is a 46 y o  male  : 1965    MRN: [id number]      Assessment and Plan   Wasp sting, accidental or unintentional, initial encounter [T63 524B]  1  Wasp sting, accidental or unintentional, initial encounter  predniSONE 10 mg tablet   2  Pain and swelling of forearm, right      At this time will provide patient with a course of prednisone taper  Take medication as noted  Recommend use of OTC calamine lotion, and Benadryl  Discouraged patient from scratching area to prevent infection  Patient understands and agrees with treatment plans  Roddy Ken was seen today for insect bite  Diagnoses and all orders for this visit:    Wasp sting, accidental or unintentional, initial encounter  -     predniSONE 10 mg tablet; Take 6 tablets today and decrease by one tablet daily until gone    Pain and swelling of forearm, right        Patient Instructions   There are no Patient Instructions on file for this visit  Proceed to ER if symptoms worsen  Chief Complaint     Chief Complaint   Patient presents with    Insect Bite     Pt was stung by wasp yesterdat at 1pm on right wrist   Right forearm is swollen, reddened and cool  Pt reports pain 3/10  History of Present Illness       46Year old Pt presents for right forearm swelling x 1 days  Pt reports he was stung by a wasp yesterday at 1:00 p m  on his right wrist    Patient states waking up early this morning with right forearm swelling up to his elbow  Has taken OTC benadryl (2 tablets) at 5:30am which improved swelling  Patient rates pain 3/10  Admits to itching, redness  Patient states his right hand feels cold  Denies redness,  open wound, discharge  Denies tongue/throat swelling  Denies trouble swallowing  Denies trouble breathing  Denies allergy to wasps  Review of Systems   Review of Systems   Constitutional: Negative for chills, fatigue and fever  Respiratory: Negative  Cardiovascular: Negative  Musculoskeletal: Positive for arthralgias ( right forearm) and joint swelling (Right forearm)     Skin:        Swelling and redness of right forearm         Current Medications       Current Outpatient Medications:     CRANBERRY PO, Take 1,000 mg by mouth daily, Disp: , Rfl:     Multiple Vitamins-Minerals (MULTIVITAL PO), Take by mouth 2 (two) times a day, Disp: , Rfl:     Omega-3 Fatty Acids (FISH OIL) 1000 MG CPDR, Take by mouth daily, Disp: , Rfl: 0    simvastatin (ZOCOR) 20 mg tablet, Take 1 tablet (20 mg total) by mouth daily, Disp: 90 tablet, Rfl: 1    predniSONE 10 mg tablet, Take 6 tablets today and decrease by one tablet daily until gone, Disp: 21 tablet, Rfl: 0    Current Allergies     Allergies as of 08/05/2019    (No Known Allergies)              Past Medical History:   Diagnosis Date    Hematuria     Hyperlipidemia     Kidney stones 2017    Raynaud's phenomenon     Renal disorder        Past Surgical History:   Procedure Laterality Date    CYSTOSCOPY  2017    KIDNEY STONE SURGERY      KNEE ARTHROSCOPY Left 06/06/2005    w/Medial Meniscectomy    LA CYSTO/URETERO W/LITHOTRIPSY &INDWELL STENT INSRT Right 2/14/2019    Procedure: CYSTOSCOPY URETEROSCOPY WITH LITHOTRIPSY HOLMIUM LASER, RETROGRADE PYELOGRAM AND INSERTION STENT URETERAL, stone basket extraction;  Surgeon: Yarely Reyes MD;  Location: [facility] MAIN OR;  Service: Urology    LA CYSTO/URETERO/PYELOSCOPY W/LITHOTRIPSY Left 2/9/2017    Procedure: HOLMIUM LASER, URETEROSOCPY ;  Surgeon: Gabi Lin MD;  Location: [facility] MAIN OR;  Service: Urology    LA CYSTOURETHROSCOPY,URETER CATHETER Left 2/9/2017    Procedure: CYSTOSCOPY RETROGRADE PYELOGRAM WITH STENT INSERTION;  Surgeon: Gabi Lin MD;  Location: [facility] MAIN OR;  Service: Urology       Family History   Problem Relation Age of Onset    Stroke Mother         CVA    Hypertension Mother         Essential    Hyperlipidemia Mother     Hypertension Father         Essential    Hyperlipidemia Father          Medications have been verified  The following portions of the patient's history were reviewed and updated as appropriate: allergies, current medications, past family history, past medical history, past social history, past surgical history and problem list     Objective   /84   Pulse 79   Temp 98 °F (36 7 °C)   Resp 16   Ht 5' 10" (1 778 m)   Wt 82 1 kg (181 lb)   SpO2 97%   BMI 25 97 kg/m²      Physical Exam     Physical Exam   Constitutional: He is oriented to person, place, and time  He appears well-developed  No distress  Cardiovascular: Normal rate, regular rhythm, normal heart sounds and intact distal pulses  Exam reveals no gallop and no friction rub  No murmur heard  Pulmonary/Chest: Effort normal and breath sounds normal  No stridor  No respiratory distress  He has no wheezes  He has no rales  He exhibits no tenderness  Musculoskeletal:        Right forearm: He exhibits swelling  He exhibits no tenderness, no bony tenderness, no deformity and no laceration  Arms:  Neurological: He is alert and oriented to person, place, and time  No cranial nerve deficit  Skin: Skin is warm  Capillary refill takes less than 2 seconds  He is not diaphoretic  Nursing note and vitals reviewed        Ronel Sevilla PA-C

## 2019-08-08 ENCOUNTER — APPOINTMENT (OUTPATIENT)
Dept: PHYSICAL THERAPY | Age: 54
End: 2019-08-08
Payer: COMMERCIAL

## 2019-10-24 ENCOUNTER — OFFICE VISIT (OUTPATIENT)
Dept: UROLOGY | Facility: CLINIC | Age: 54
End: 2019-10-24
Payer: COMMERCIAL

## 2019-10-24 VITALS
HEART RATE: 72 BPM | HEIGHT: 70 IN | WEIGHT: 184 LBS | BODY MASS INDEX: 26.34 KG/M2 | SYSTOLIC BLOOD PRESSURE: 118 MMHG | DIASTOLIC BLOOD PRESSURE: 74 MMHG

## 2019-10-24 DIAGNOSIS — N20.9 CALCULUS OF UPPER URINARY TRACT: ICD-10-CM

## 2019-10-24 DIAGNOSIS — N40.0 BENIGN PROSTATIC HYPERPLASIA, UNSPECIFIED WHETHER LOWER URINARY TRACT SYMPTOMS PRESENT: Primary | ICD-10-CM

## 2019-10-24 PROCEDURE — 99213 OFFICE O/P EST LOW 20 MIN: CPT | Performed by: UROLOGY

## 2019-10-24 NOTE — PROGRESS NOTES
Progress Note - Urology  Renan Dasilva 46 y o  male MRN: [id number]  Encounter: [id number]      Chief Complaint: No chief complaint on file  HPI:     78-year-old male presents for follow-up prostate evaluation  He has had past history of urolithiasis  Last CT scan in January showed a 3 mm stone which was removed  He had no other residual stones noted on that study  He has no dysuria hematuria  His AUA score is 5  His PSA is 0 5  He has nocturia x2 but this may well be related to a sleep disorder  No family history of prostate disease      MEDS:    Current Outpatient Medications:     CRANBERRY PO, Take 1,000 mg by mouth daily, Disp: , Rfl:     Multiple Vitamins-Minerals (MULTIVITAL PO), Take by mouth 2 (two) times a day, Disp: , Rfl:     Omega-3 Fatty Acids (FISH OIL) 1000 MG CPDR, Take by mouth daily, Disp: , Rfl: 0    simvastatin (ZOCOR) 20 mg tablet, Take 1 tablet (20 mg total) by mouth daily, Disp: 90 tablet, Rfl: 1      PMH:  Past Medical History:   Diagnosis Date    Hematuria     Hyperlipidemia     Kidney stones 2017    Raynaud's phenomenon     Renal disorder          PSH  Past Surgical History:   Procedure Laterality Date    CYSTOSCOPY  2017    KIDNEY STONE SURGERY      KNEE ARTHROSCOPY Left 06/06/2005    w/Medial Meniscectomy    [place] CYSTO/URETERO W/LITHOTRIPSY &INDWELL STENT INSRT Right 2/14/2019    Procedure: CYSTOSCOPY URETEROSCOPY WITH LITHOTRIPSY HOLMIUM LASER, RETROGRADE PYELOGRAM AND INSERTION STENT URETERAL, stone basket extraction;  Surgeon: Cesar Mcgregor MD;  Location: [facility] MAIN OR;  Service: Urology    NC CYSTO/URETERO/PYELOSCOPY W/LITHOTRIPSY Left 2/9/2017    Procedure: HOLMIUM LASER, URETEROSOCPY ;  Surgeon: Cheyenne Hsu MD;  Location: [facility] MA[facility];  Service: Urology    NC CYSTOURETHROSCOPY,URETER CATHETER Left 2/9/2017    Procedure: CYSTOSCOPY RETROGRADE PYELOGRAM WITH STENT INSERTION;  Surgeon: Cheyenne Hsu MD;  Location: [facility] MAIN [facility];  Service: Urology [facility]  Family History   Problem Relation Age of Onset    Stroke Mother         CVA    Hypertension Mother         Essential    Hyperlipidemia Mother     Hypertension Father         Essential    Hyperlipidemia Father         SH  Social History     Socioeconomic History    Marital status: /Civil Union     Spouse name: None    Number of children: None    Years of education: None    Highest education level: None   Occupational History    None   Social Needs    Financial resource strain: None    Food insecurity:     Worry: None     Inability: None    Transportation needs:     Medical: None     Non-medical: None   Tobacco Use    Smoking status: Never Smoker    Smokeless tobacco: Never Used   Substance and Sexual Activity    Alcohol use: Yes     Comment: Social    Drug use: No    Sexual activity: None   Lifestyle    Physical activity:     Days per week: None     Minutes per session: None    Stress: None   Relationships    Social connections:     Talks on phone: None     Gets together: None     Attends Sikh service: None     Active member of club or organization: None     Attends meetings of clubs or organizations: None     Relationship status: None    Intimate partner violence:     Fear of current or ex partner: None     Emotionally abused: None     Physically abused: None     Forced sexual activity: None   Other Topics Concern    None   Social History Narrative    None          ROS:  Review of Systems   Constitutional: Negative  Respiratory: Negative  Cardiovascular: Negative  Neurological: Negative  Psychiatric/Behavioral: Negative  Vitals:  Blood pressure 118/74, pulse 72, height 5' 10" (1 778 m), weight 83 5 kg (184 lb)  Physical Exam:     43-year-old male in no acute distress  The chest shows no gynecomastia  The abdomen is soft  There are no masses no tenderness no liver spleen enlargement  The bladder is not distended    Genital structures show normal penis without abnormality  The meatus is normal no discharge  The groin shows no adenopathy  No hernia defects were noted  Both testes are palpated in the scrotum  Testes are unremarkable for age  Epididymis is normal   No scrotal masses are noted  Rectal examination shows a thrombosed hemorrhoid  The sphincter tone is normal   The prostate is symmetrical grossly benign 28-30 g no nodule       Lab, Imaging and other studies:  No results found for this or any previous visit (from the past 672 hour(s))  IMPRESSION:   1  BPH   2  History urolithiasis   3  Thrombosed hemorrhoid    PLAN:   no urologic intervention at this time  I recommended a sleep study be performed  I also recommended a colonoscopy and colorectal surgery to evaluate the hemorrhoid  Also recommended discontinuation of cranberry in view of his stone history      Please note :  Voice dictation software has been used to create this document  There may be inadvertent transcription errors

## 2020-01-15 ENCOUNTER — TELEPHONE (OUTPATIENT)
Dept: INTERNAL MEDICINE CLINIC | Facility: CLINIC | Age: 55
End: 2020-01-15

## 2020-01-15 NOTE — TELEPHONE ENCOUNTER
ARYOM for patient to call and reschedule his 1/29/20 appointment per his request through [facility]

## 2020-02-01 ENCOUNTER — APPOINTMENT (OUTPATIENT)
Dept: LAB | Facility: MEDICAL CENTER | Age: 55
End: 2020-02-01
Payer: COMMERCIAL

## 2020-02-01 DIAGNOSIS — E03.8 SUBCLINICAL HYPOTHYROIDISM: ICD-10-CM

## 2020-02-01 DIAGNOSIS — E78.00 HYPERCHOLESTEROLEMIA: ICD-10-CM

## 2020-02-01 DIAGNOSIS — N40.0 BENIGN PROSTATIC HYPERPLASIA, UNSPECIFIED WHETHER LOWER URINARY TRACT SYMPTOMS PRESENT: ICD-10-CM

## 2020-02-01 LAB
ALBUMIN SERPL BCP-MCNC: 3.7 G/DL (ref 3.5–5)
ALP SERPL-CCNC: 72 U/L (ref 46–116)
ALT SERPL W P-5'-P-CCNC: 32 U/L (ref 12–78)
ANION GAP SERPL CALCULATED.3IONS-SCNC: 0 MMOL/L (ref 4–13)
AST SERPL W P-5'-P-CCNC: 16 U/L (ref 5–45)
BASOPHILS # BLD AUTO: 0.04 THOUSANDS/ΜL (ref 0–0.1)
BASOPHILS NFR BLD AUTO: 1 % (ref 0–1)
BILIRUB SERPL-MCNC: 0.57 MG/DL (ref 0.2–1)
BUN SERPL-MCNC: 14 MG/DL (ref 5–25)
CALCIUM SERPL-MCNC: 9.1 MG/DL (ref 8.3–10.1)
CHLORIDE SERPL-SCNC: 108 MMOL/L (ref 100–108)
CHOLEST SERPL-MCNC: 135 MG/DL (ref 50–200)
CO2 SERPL-SCNC: 32 MMOL/L (ref 21–32)
CREAT SERPL-MCNC: 1 MG/DL (ref 0.6–1.3)
EOSINOPHIL # BLD AUTO: 0.13 THOUSAND/ΜL (ref 0–0.61)
EOSINOPHIL NFR BLD AUTO: 3 % (ref 0–6)
ERYTHROCYTE [DISTWIDTH] IN BLOOD BY AUTOMATED COUNT: 12 % (ref 11.6–15.1)
GFR SERPL CREATININE-BSD FRML MDRD: 84 ML/MIN/1.73SQ M
GLUCOSE P FAST SERPL-MCNC: 85 MG/DL (ref 65–99)
HCT VFR BLD AUTO: 47.1 % (ref 36.5–49.3)
HDLC SERPL-MCNC: 26 MG/DL
HGB BLD-MCNC: 15.8 G/DL (ref 12–17)
IMM GRANULOCYTES # BLD AUTO: 0.02 THOUSAND/UL (ref 0–0.2)
IMM GRANULOCYTES NFR BLD AUTO: 1 % (ref 0–2)
LDLC SERPL CALC-MCNC: 65 MG/DL (ref 0–100)
LYMPHOCYTES # BLD AUTO: 1.33 THOUSANDS/ΜL (ref 0.6–4.47)
LYMPHOCYTES NFR BLD AUTO: 32 % (ref 14–44)
MCH RBC QN AUTO: 30.4 PG (ref 26.8–34.3)
MCHC RBC AUTO-ENTMCNC: 33.5 G/DL (ref 31.4–37.4)
MCV RBC AUTO: 91 FL (ref 82–98)
MONOCYTES # BLD AUTO: 0.23 THOUSAND/ΜL (ref 0.17–1.22)
MONOCYTES NFR BLD AUTO: 6 % (ref 4–12)
NEUTROPHILS # BLD AUTO: 2.41 THOUSANDS/ΜL (ref 1.85–7.62)
NEUTS SEG NFR BLD AUTO: 57 % (ref 43–75)
NONHDLC SERPL-MCNC: 109 MG/DL
NRBC BLD AUTO-RTO: 0 /100 WBCS
PLATELET # BLD AUTO: 168 THOUSANDS/UL (ref 149–390)
PMV BLD AUTO: 10.7 FL (ref 8.9–12.7)
POTASSIUM SERPL-SCNC: 4 MMOL/L (ref 3.5–5.3)
PROT SERPL-MCNC: 7.2 G/DL (ref 6.4–8.2)
RBC # BLD AUTO: 5.2 MILLION/UL (ref 3.88–5.62)
SODIUM SERPL-SCNC: 140 MMOL/L (ref 136–145)
TRIGL SERPL-MCNC: 220 MG/DL
TSH SERPL DL<=0.05 MIU/L-ACNC: 2.24 UIU/ML (ref 0.36–3.74)
WBC # BLD AUTO: 4.16 THOUSAND/UL (ref 4.31–10.16)

## 2020-02-01 PROCEDURE — 80053 COMPREHEN METABOLIC PANEL: CPT

## 2020-02-01 PROCEDURE — 85025 COMPLETE CBC W/AUTO DIFF WBC: CPT

## 2020-02-01 PROCEDURE — 80061 LIPID PANEL: CPT

## 2020-02-01 PROCEDURE — 36415 COLL VENOUS BLD VENIPUNCTURE: CPT

## 2020-02-01 PROCEDURE — 84443 ASSAY THYROID STIM HORMONE: CPT

## 2020-02-07 ENCOUNTER — OFFICE VISIT (OUTPATIENT)
Dept: INTERNAL MEDICINE CLINIC | Facility: CLINIC | Age: 55
End: 2020-02-07
Payer: COMMERCIAL

## 2020-02-07 VITALS
SYSTOLIC BLOOD PRESSURE: 112 MMHG | HEART RATE: 72 BPM | HEIGHT: 69 IN | TEMPERATURE: 98.3 F | DIASTOLIC BLOOD PRESSURE: 60 MMHG | BODY MASS INDEX: 26.1 KG/M2 | OXYGEN SATURATION: 97 % | WEIGHT: 176.2 LBS

## 2020-02-07 DIAGNOSIS — R06.81 WITNESSED EPISODE OF APNEA: ICD-10-CM

## 2020-02-07 DIAGNOSIS — E03.8 SUBCLINICAL HYPOTHYROIDISM: Primary | ICD-10-CM

## 2020-02-07 DIAGNOSIS — R53.83 OTHER FATIGUE: ICD-10-CM

## 2020-02-07 DIAGNOSIS — E78.00 HYPERCHOLESTEROLEMIA: ICD-10-CM

## 2020-02-07 PROCEDURE — 1036F TOBACCO NON-USER: CPT | Performed by: NURSE PRACTITIONER

## 2020-02-07 PROCEDURE — 99214 OFFICE O/P EST MOD 30 MIN: CPT | Performed by: NURSE PRACTITIONER

## 2020-02-07 PROCEDURE — 3008F BODY MASS INDEX DOCD: CPT | Performed by: NURSE PRACTITIONER

## 2020-02-07 RX ORDER — SIMVASTATIN 20 MG
20 TABLET ORAL DAILY
Qty: 90 TABLET | Refills: 1 | Status: SHIPPED | OUTPATIENT
Start: 2020-02-07 | End: 2020-08-14 | Stop reason: SDUPTHER

## 2020-02-07 NOTE — PROGRESS NOTES
Assessment/Plan:    Patient presents for six-month follow-up  See details noted below  Blood work was reviewed  Patient to follow-up in six months, sooner if needed  Patient does have an increase in his stress level  His 43-year-old son recently moved back home which is causing some stress  He reports that his overall tolerable however    Patient does have bilateral cerumen impaction  He should use over-the-counter Debrox drops and return for irrigation  If continues with decreased hearing will check hearing test    Problem List Items Addressed This Visit        Endocrine    Subclinical hypothyroidism - Primary      Asymptomatic  Last TSH was normal   Continue to sporadically follow            Other    Hypercholesterolemia      There was a slight increase in his triglycerides  Overall his lipid panel is controlled with an ASCVD risk of 5 5 percent  Continue simvastatin 20 milligrams daily  He has not been consistent with his fish oil  Thus the increase in his triglycerides and the decrease in his HDL  Discussed with patient to try and take more frequently  Relevant Medications    simvastatin (ZOCOR) 20 mg tablet    Other fatigue    Witnessed episode of apnea      Patient needs to schedule his home sleep study  Reprinted information so he could call to get coordinated             M*Modal software was used to dictate this note  It may contain errors with dictating incorrect words or incorrect spelling  Please contact the provider directly with any questions  Subjective:      Patient ID: Lilibeth  is a 53 y o  male  HPI    Patient presents for routine follow-up  Patient did not get his sleep study completed yet    Recovering from cold    Hyperlipidemia:  Pt is here for follow-up hyperlipidemia  Pt is doing well with no concerns  Pt is currently taking simvastatin and fish oil   He did miss some doses of his fish oil fairly frequently  Tolerating  well    Side effects of medication include  none  Pt diet consists of  Overall healthy  Is limited red meat  Primarily chicken fish  Adequate fruits and vegetables         The 10-year ASCVD risk score (Andrea Sultana et al , 2013) is: 5%    Values used to calculate the score:      Age: 53 years      Sex: Male      Is Non- : No      Diabetic: No      Tobacco smoker: No      Systolic Blood Pressure: 556 mmHg      Is BP treated: No      HDL Cholesterol: 26 mg/dL      Total Cholesterol: 135 mg/dL    The following portions of the patient's history were reviewed and updated as appropriate: allergies, current medications, past family history, past medical history, past social history, past surgical history and problem list     Review of Systems   Constitutional: Positive for fatigue  Negative for activity change, appetite change, chills and fever  HENT: Positive for congestion and postnasal drip  Negative for sinus pressure, sinus pain and sore throat  Respiratory: Positive for cough (intermittent)  Negative for shortness of breath and wheezing  Cardiovascular: Negative for chest pain, palpitations and leg swelling  Gastrointestinal: Negative for abdominal pain, constipation, diarrhea, nausea and vomiting  Musculoskeletal: Negative for arthralgias and myalgias  Skin: Negative for rash  Neurological: Negative for dizziness, seizures, syncope, light-headedness and headaches  Psychiatric/Behavioral: Negative for dysphoric mood and sleep disturbance  The patient is not nervous/anxious           + stress         Past Medical History:   Diagnosis Date    Chronic kidney disease 2017    Hematuria     Hyperlipidemia     Kidney stones 2017    Raynaud's phenomenon     Renal disorder          Current Outpatient Medications:     Multiple Vitamins-Minerals (MULTIVITAL PO), Take by mouth 2 (two) times a day, Disp: , Rfl:     Omega-3 Fatty Acids (FISH OIL) 1000 MG CPDR, Take by mouth daily, Disp: , Rfl: 0   simvastatin (ZOCOR) 20 mg tablet, Take 1 tablet (20 mg total) by mouth daily, Disp: 90 tablet, Rfl: 1    Allergies   Allergen Reactions    Prednazoline Dermatitis, Hives, Itching and Rash       Social History   Past Surgical History:   Procedure Laterality Date    CYSTOSCOPY  2017    KIDNEY STONE SURGERY      KNEE ARTHROSCOPY Left 06/06/2005    w/Medial Meniscectomy    IA CYSTO/URETERO W/LITHOTRIPSY &INDWELL STENT INSRT Right 2/14/2019    Procedure: CYSTOSCOPY URETEROSCOPY WITH LITHOTRIPSY HOLMIUM LASER, RETROGRADE PYELOGRAM AND INSERTION STENT URETERAL, stone basket extraction;  Surgeon: Brandon Gomez MD;  Location: [address] [place] [address] MAIN OR;  Service: Urology    IA CYSTO/URETERO/PYELOSCOPY W/LITHOTRIPSY Left 2/9/2017    Procedure: HOLMIUM LASER, URETEROSOCPY ;  Surgeon: Nano Arreola MD;  Location:  MAIN OR;  Service: Urology    IA CYSTOURETHROSCOPY,URETER CATHETER Left 2/9/2017    Procedure: CYSTOSCOPY RETROGRADE PYELOGRAM WITH STENT INSERTION;  Surgeon: Nano Arreola MD;  [facility];  Service: Urology     Family History   Problem Relation Age of Onset    Stroke Mother         CVA    Hypertension Mother         Essential    Hyperlipidemia Mother     Hypertension Father         Essential    Hyperlipidemia Father        Objective:  /60 (BP Location: Right arm, Patient Position: Sitting, Cuff Size: Adult)   Pulse 72   Temp 98 3 °F (36 8 °C) (Oral)   Ht 5' 9" (1 753 m) Comment: shoes on  Wt 79 9 kg (176 lb 3 2 oz) Comment: shoes on  SpO2 97%   BMI 26 02 kg/m²      Physical Exam   Constitutional: He is oriented to person, place, and time  He appears well-developed and well-nourished  No distress  HENT:   Head: Normocephalic and atraumatic     Right Ear: Hearing, external ear and ear canal normal    Left Ear: Hearing, external ear and ear canal normal    Nose: Nose normal    Mouth/Throat: Uvula is midline, oropharynx is clear and moist and mucous membranes are normal  No oropharyngeal exudate  Bilateral cerumen impaction  Neck: Normal range of motion  Neck supple  No thyromegaly present  Cardiovascular: Normal rate, regular rhythm and normal heart sounds  No murmur heard  No pedal edema   Pulmonary/Chest: Effort normal and breath sounds normal  No respiratory distress  He has no wheezes  Lymphadenopathy:     He has no cervical adenopathy  Neurological: He is alert and oriented to person, place, and time  No focal deficits   Skin: Skin is warm and dry  Psychiatric: He has a normal mood and affect  His behavior is normal  Judgment and thought content normal    Nursing note and vitals reviewed

## 2020-02-07 NOTE — ASSESSMENT & PLAN NOTE
There was a slight increase in his triglycerides  Overall his lipid panel is controlled with an ASCVD risk of 5 5 percent  Continue simvastatin 20 milligrams daily  He has not been consistent with his fish oil  Thus the increase in his triglycerides and the decrease in his HDL  Discussed with patient to try and take more frequently

## 2020-02-07 NOTE — PATIENT INSTRUCTIONS
May use OTC debrox drops - if continue with decreased hearing return and can do hearing tests  No medication changes         Follow-up in 6 months, sooner if needed

## 2020-07-16 ENCOUNTER — OFFICE VISIT (OUTPATIENT)
Dept: URGENT CARE | Facility: MEDICAL CENTER | Age: 55
End: 2020-07-16
Payer: COMMERCIAL

## 2020-07-16 VITALS
RESPIRATION RATE: 18 BRPM | WEIGHT: 178.4 LBS | BODY MASS INDEX: 26.42 KG/M2 | DIASTOLIC BLOOD PRESSURE: 70 MMHG | OXYGEN SATURATION: 96 % | HEIGHT: 69 IN | SYSTOLIC BLOOD PRESSURE: 140 MMHG | TEMPERATURE: 98 F | HEART RATE: 78 BPM

## 2020-07-16 DIAGNOSIS — L03.116 CELLULITIS OF LEFT ANKLE: ICD-10-CM

## 2020-07-16 DIAGNOSIS — T63.441A BEE STING, ACCIDENTAL OR UNINTENTIONAL, INITIAL ENCOUNTER: Primary | ICD-10-CM

## 2020-07-16 PROCEDURE — G0382 LEV 3 HOSP TYPE B ED VISIT: HCPCS | Performed by: PHYSICIAN ASSISTANT

## 2020-07-16 RX ORDER — CEPHALEXIN 500 MG/1
500 CAPSULE ORAL EVERY 8 HOURS SCHEDULED
Qty: 21 CAPSULE | Refills: 0 | Status: SHIPPED | OUTPATIENT
Start: 2020-07-16 | End: 2020-07-23

## 2020-07-16 NOTE — PROGRESS NOTES
[facility]        NAME: Yanet Barrera is a 53 y o  male  : 1965    MRN: [id number]  DATE: 2020  TIME: 5:33 PM    Assessment and Plan   Bee sting, accidental or unintentional, initial encounter [T63 [id number]]  1  Bee sting, accidental or unintentional, initial encounter     2  Cellulitis of left ankle  cephalexin (KEFLEX) 500 mg capsule     Appears to be cellulitis from bee sting  Will treat with Keflex  Recommended icing and Tylenol or Motrin for pain  Patient Instructions   Tylenol or Motrin for pain  Keflex as directed  Follow up with PCP in 3-5 days  Proceed to  ER if symptoms worsen  Chief Complaint     Chief Complaint   Patient presents with    Bee Sting     Wasp sting on pt's left ankle yesterday  Left ankle is red, swolllen and painful today  History of Present Illness       Patient is a 58-year-old male who presents today with left ankle swelling and pain after being bit by a wasp yesterday  Patient states he has been icing the area but reports warmth  No fevers or chills  No shortness of breath or lip or tongue swelling  Review of Systems   Review of Systems   Constitutional: Negative for chills and fever  Respiratory: Negative for cough and shortness of breath  Cardiovascular: Negative for chest pain  Musculoskeletal: Positive for myalgias  Negative for arthralgias  Skin: Positive for color change and wound          Swelling         Current Medications       Current Outpatient Medications:     Multiple Vitamins-Minerals (MULTIVITAL PO), Take by mouth 2 (two) times a day, Disp: , Rfl:     Omega-3 Fatty Acids (FISH OIL) 1000 MG CPDR, Take by mouth daily, Disp: , Rfl: 0    simvastatin (ZOCOR) 20 mg tablet, Take 1 tablet (20 mg total) by mouth daily, Disp: 90 tablet, Rfl: 1    cephalexin (KEFLEX) 500 mg capsule, Take 1 capsule (500 mg total) by mouth every 8 (eight) hours for 7 days, Disp: 21 capsule, Rfl: 0    Current Allergies     Allergies as of 07/16/2020 - Reviewed 07/16/2020   Allergen Reaction Noted    Prednazoline Dermatitis, Hives, Itching, and Rash 08/08/2019            The following portions of the patient's history were reviewed and updated as appropriate: allergies, current medications, past family history, past medical history, past social history, past surgical history and problem list      Past Medical History:   Diagnosis Date    Chronic kidney disease 2017    Hematuria     Hyperlipidemia     Kidney stones 2017    Raynaud's phenomenon     Renal disorder        Past Surgical History:   Procedure Laterality Date    CYSTOSCOPY  2017    KIDNEY STONE SURGERY      KNEE ARTHROSCOPY Left 06/06/2005    w/Medial Meniscectomy    DE CYSTO/URETERO W/LITHOTRIPSY &INDWELL STENT INSRT Right 2/14/2019    Procedure: CYSTOSCOPY URETEROSCOPY WITH LITHOTRIPSY HOLMIUM LASER, RETROGRADE PYELOGRAM AND INSERTION STENT URETERAL, stone basket extraction;  Surgeon: Alyse Varner MD;  Location: [address] [place] [address] MAIN OR;  Service: Urology    DE CYSTO/URETERO/PYELOSCOPY W/LITHOTRIPSY Left 2/9/2017    Procedure: HOLMIUM Bravo Gals ;  Surgeon: Marilyn Womack MD;  Location:  MAIN OR;  Service: Urology    DE CYSTOURETHROSCOPY,URETER CATHETER Left 2/9/2017    Procedure: CYSTOSCOPY RETROGRADE PYELOGRAM WITH STENT INSERTION;  Surgeon: Marilyn Womack MD;  [facility];  Service: Urology       Family History   Problem Relation Age of Onset    Stroke Mother         CVA    Hypertension Mother         Essential    Hyperlipidemia Mother     Hypertension Father         Essential    Hyperlipidemia Father          Medications have been verified  Objective   /70   Pulse 78   Temp 98 °F (36 7 °C) (Temporal)   Resp 18   Ht 5' 9" (1 753 m)   Wt 80 9 kg (178 lb 6 4 oz)   SpO2 96%   BMI 26 35 kg/m²        Physical Exam     Physical Exam   Constitutional: He appears well-developed and well-nourished  No distress     HENT:   Mouth/Throat: Oropharynx is clear and moist    Cardiovascular: Normal rate and regular rhythm  Pulmonary/Chest: Effort normal and breath sounds normal    Musculoskeletal: He exhibits edema and tenderness  Skin: Skin is warm and dry  Lesion noted  There is erythema

## 2020-07-29 DIAGNOSIS — E78.00 HYPERCHOLESTEROLEMIA: ICD-10-CM

## 2020-07-29 RX ORDER — SIMVASTATIN 20 MG
TABLET ORAL
Qty: 90 TABLET | Refills: 1 | OUTPATIENT
Start: 2020-07-29

## 2020-08-10 ENCOUNTER — TELEPHONE (OUTPATIENT)
Dept: INTERNAL MEDICINE CLINIC | Facility: CLINIC | Age: 55
End: 2020-08-10

## 2020-08-10 ENCOUNTER — TELEPHONE (OUTPATIENT)
Dept: ADMINISTRATIVE | Facility: OTHER | Age: 55
End: 2020-08-10

## 2020-08-10 DIAGNOSIS — E78.00 HYPERCHOLESTEROLEMIA: ICD-10-CM

## 2020-08-10 DIAGNOSIS — E03.8 SUBCLINICAL HYPOTHYROIDISM: Primary | ICD-10-CM

## 2020-08-10 DIAGNOSIS — Z12.5 SCREENING FOR PROSTATE CANCER: ICD-10-CM

## 2020-08-10 NOTE — TELEPHONE ENCOUNTER
----- Message from [facility] sent at 8/9/2020 11:51 AM EDT -----  Regarding: colonoscopy [facility] [place]  08/09/20 11:52 AM    Hello, our patient Yrn Lui has had CRC: Colonoscopy completed/performed  Please assist in updating the patient chart by making an External outreach to [facility] located in The date of service is 2017  There is a link in  for colonoscopy but no actual report is linked,  Please obtain report      Thank you,  [address]

## 2020-08-10 NOTE — TELEPHONE ENCOUNTER
Upon review of the In Basket request we were Unfortunately I am going to have to outreach to the Liaisons for help  this message will now be closed     Any additional questions or concerns should be emailed to the Practice Liaisons via [email or web address] email, please do not reply via In Basket      Thank you  Yara Holder

## 2020-08-10 NOTE — TELEPHONE ENCOUNTER
Patient has upcoming appt with Nicole on Friday  Did you want patient to have labwork before his appt

## 2020-08-10 NOTE — TELEPHONE ENCOUNTER
Orders placed  Please have pt keep appt  If labs not back at appt - I will call him to review after appt      Fasting labs    Thanks

## 2020-08-13 ENCOUNTER — APPOINTMENT (OUTPATIENT)
Dept: LAB | Age: 55
End: 2020-08-13
Payer: COMMERCIAL

## 2020-08-13 DIAGNOSIS — E03.8 SUBCLINICAL HYPOTHYROIDISM: ICD-10-CM

## 2020-08-13 DIAGNOSIS — Z12.5 SCREENING FOR PROSTATE CANCER: ICD-10-CM

## 2020-08-13 DIAGNOSIS — E78.00 HYPERCHOLESTEROLEMIA: ICD-10-CM

## 2020-08-13 LAB
ALBUMIN SERPL BCP-MCNC: 4 G/DL (ref 3.5–5)
ALP SERPL-CCNC: 81 U/L (ref 46–116)
ALT SERPL W P-5'-P-CCNC: 37 U/L (ref 12–78)
ANION GAP SERPL CALCULATED.3IONS-SCNC: 3 MMOL/L (ref 4–13)
AST SERPL W P-5'-P-CCNC: 18 U/L (ref 5–45)
BASOPHILS # BLD AUTO: 0.04 THOUSANDS/ΜL (ref 0–0.1)
BASOPHILS NFR BLD AUTO: 1 % (ref 0–1)
BILIRUB SERPL-MCNC: 0.5 MG/DL (ref 0.2–1)
BUN SERPL-MCNC: 14 MG/DL (ref 5–25)
CALCIUM SERPL-MCNC: 9 MG/DL (ref 8.3–10.1)
CHLORIDE SERPL-SCNC: 107 MMOL/L (ref 100–108)
CHOLEST SERPL-MCNC: 171 MG/DL (ref 50–200)
CO2 SERPL-SCNC: 30 MMOL/L (ref 21–32)
CREAT SERPL-MCNC: 1.08 MG/DL (ref 0.6–1.3)
EOSINOPHIL # BLD AUTO: 0.17 THOUSAND/ΜL (ref 0–0.61)
EOSINOPHIL NFR BLD AUTO: 4 % (ref 0–6)
ERYTHROCYTE [DISTWIDTH] IN BLOOD BY AUTOMATED COUNT: 12.5 % (ref 11.6–15.1)
GFR SERPL CREATININE-BSD FRML MDRD: 77 ML/MIN/1.73SQ M
GLUCOSE P FAST SERPL-MCNC: 94 MG/DL (ref 65–99)
HCT VFR BLD AUTO: 47.9 % (ref 36.5–49.3)
HDLC SERPL-MCNC: 31 MG/DL
HGB BLD-MCNC: 16.3 G/DL (ref 12–17)
IMM GRANULOCYTES # BLD AUTO: 0.04 THOUSAND/UL (ref 0–0.2)
IMM GRANULOCYTES NFR BLD AUTO: 1 % (ref 0–2)
LDLC SERPL CALC-MCNC: 96 MG/DL (ref 0–100)
LYMPHOCYTES # BLD AUTO: 1.14 THOUSANDS/ΜL (ref 0.6–4.47)
LYMPHOCYTES NFR BLD AUTO: 26 % (ref 14–44)
MCH RBC QN AUTO: 30.5 PG (ref 26.8–34.3)
MCHC RBC AUTO-ENTMCNC: 34 G/DL (ref 31.4–37.4)
MCV RBC AUTO: 90 FL (ref 82–98)
MONOCYTES # BLD AUTO: 0.27 THOUSAND/ΜL (ref 0.17–1.22)
MONOCYTES NFR BLD AUTO: 6 % (ref 4–12)
NEUTROPHILS # BLD AUTO: 2.68 THOUSANDS/ΜL (ref 1.85–7.62)
NEUTS SEG NFR BLD AUTO: 62 % (ref 43–75)
NONHDLC SERPL-MCNC: 140 MG/DL
NRBC BLD AUTO-RTO: 0 /100 WBCS
PLATELET # BLD AUTO: 150 THOUSANDS/UL (ref 149–390)
PMV BLD AUTO: 11 FL (ref 8.9–12.7)
POTASSIUM SERPL-SCNC: 5 MMOL/L (ref 3.5–5.3)
PROT SERPL-MCNC: 7.6 G/DL (ref 6.4–8.2)
PSA SERPL-MCNC: 0.6 NG/ML (ref 0–4)
RBC # BLD AUTO: 5.34 MILLION/UL (ref 3.88–5.62)
SODIUM SERPL-SCNC: 140 MMOL/L (ref 136–145)
TRIGL SERPL-MCNC: 222 MG/DL
TSH SERPL DL<=0.05 MIU/L-ACNC: 3.1 UIU/ML (ref 0.36–3.74)
WBC # BLD AUTO: 4.34 THOUSAND/UL (ref 4.31–10.16)

## 2020-08-13 PROCEDURE — 85025 COMPLETE CBC W/AUTO DIFF WBC: CPT

## 2020-08-13 PROCEDURE — 84443 ASSAY THYROID STIM HORMONE: CPT

## 2020-08-13 PROCEDURE — G0103 PSA SCREENING: HCPCS

## 2020-08-13 PROCEDURE — 80053 COMPREHEN METABOLIC PANEL: CPT

## 2020-08-13 PROCEDURE — 36415 COLL VENOUS BLD VENIPUNCTURE: CPT

## 2020-08-13 PROCEDURE — 80061 LIPID PANEL: CPT

## 2020-08-14 ENCOUNTER — OFFICE VISIT (OUTPATIENT)
Dept: INTERNAL MEDICINE CLINIC | Facility: CLINIC | Age: 55
End: 2020-08-14
Payer: COMMERCIAL

## 2020-08-14 VITALS
BODY MASS INDEX: 26.93 KG/M2 | DIASTOLIC BLOOD PRESSURE: 68 MMHG | TEMPERATURE: 98.3 F | OXYGEN SATURATION: 97 % | HEIGHT: 69 IN | WEIGHT: 181.8 LBS | HEART RATE: 80 BPM | SYSTOLIC BLOOD PRESSURE: 120 MMHG

## 2020-08-14 DIAGNOSIS — R06.81 WITNESSED EPISODE OF APNEA: ICD-10-CM

## 2020-08-14 DIAGNOSIS — M23.92 LOCKING OF LEFT KNEE: ICD-10-CM

## 2020-08-14 DIAGNOSIS — M25.562 ACUTE PAIN OF LEFT KNEE: ICD-10-CM

## 2020-08-14 DIAGNOSIS — E03.8 SUBCLINICAL HYPOTHYROIDISM: ICD-10-CM

## 2020-08-14 DIAGNOSIS — E78.00 HYPERCHOLESTEROLEMIA: Primary | ICD-10-CM

## 2020-08-14 DIAGNOSIS — Z12.11 ENCOUNTER FOR SCREENING COLONOSCOPY: ICD-10-CM

## 2020-08-14 PROBLEM — M25.511 ACUTE PAIN OF RIGHT SHOULDER: Status: RESOLVED | Noted: 2019-06-28 | Resolved: 2020-08-14

## 2020-08-14 PROBLEM — R53.83 OTHER FATIGUE: Status: RESOLVED | Noted: 2019-06-28 | Resolved: 2020-08-14

## 2020-08-14 PROCEDURE — 1036F TOBACCO NON-USER: CPT | Performed by: NURSE PRACTITIONER

## 2020-08-14 PROCEDURE — 3725F SCREEN DEPRESSION PERFORMED: CPT | Performed by: NURSE PRACTITIONER

## 2020-08-14 PROCEDURE — 99214 OFFICE O/P EST MOD 30 MIN: CPT | Performed by: NURSE PRACTITIONER

## 2020-08-14 PROCEDURE — 3008F BODY MASS INDEX DOCD: CPT | Performed by: NURSE PRACTITIONER

## 2020-08-14 RX ORDER — MELOXICAM 15 MG/1
15 TABLET ORAL DAILY
Qty: 30 TABLET | Refills: 1 | Status: SHIPPED | OUTPATIENT
Start: 2020-08-14

## 2020-08-14 RX ORDER — SIMVASTATIN 40 MG
40 TABLET ORAL DAILY
Qty: 90 TABLET | Refills: 1 | Status: SHIPPED | OUTPATIENT
Start: 2020-08-14 | End: 2021-01-01 | Stop reason: SDUPTHER

## 2020-08-14 NOTE — PATIENT INSTRUCTIONS
Increase simvastatin to 40mg  Follow-up in 6 months, sooner if needed    Follow-up with ortho  Use meloxicam as needed - no OTC motrin, aleve

## 2020-08-14 NOTE — ASSESSMENT & PLAN NOTE
- lipids have increased    - will increase simvastatin to 40mg daily  - d/w pt to work on diet and exercise

## 2020-08-14 NOTE — PROGRESS NOTES
Assessment/Plan:    Patient presents for routine follow-up  See details noted below  Blood work was reviewed  Patient to follow-up in six months, sooner if needed  Blood work prior to follow-up appointment    Problem List Items Addressed This Visit        Endocrine    Subclinical hypothyroidism       Asymptomatic  Last to two TSH have been normal   Continue to sporadically monitor            Other    Hypercholesterolemia - Primary     - lipids have increased  - will increase simvastatin to 40mg daily  - d/w pt to work on diet and exercise         Relevant Medications    simvastatin (ZOCOR) 40 mg tablet    Other Relevant Orders    Comprehensive metabolic panel    Lipid panel    Witnessed episode of apnea      Patient again reminded he needs to schedule his home sleep study  Left knee pain     Patient with history left knee torn meniscus  Reports that has been bothering him more recently  No new injury trauma  Will start meloxicam for patient to use daily as needed  Discussed with patient no additional over-the-counter anti-inflammatory medications  Will start physical therapy, x-ray as well as referral to ortho for possible injections  Other Visit Diagnoses     Encounter for screening colonoscopy        Relevant Orders    Ambulatory referral for colonoscopy    Locking of left knee        Relevant Medications    meloxicam (MOBIC) 15 mg tablet    Other Relevant Orders    Ambulatory referral to Orthopedic Surgery    XR knee 3 vw left non injury    Ambulatory referral to Physical Therapy        Health Maintenance Items:  - BMI Counseling: Body mass index is 27 24 kg/m²  The BMI is above normal  Nutrition recommendations include decreasing portion sizes, encouraging healthy choices of fruits and vegetables, consuming healthier snacks, limiting drinks that contain sugar, moderation in carbohydrate intake, increasing intake of lean protein and reducing intake of cholesterol   Exercise recommendations include exercising 3-5 times per week  No pharmacotherapy was ordered  M*Modal software was used to dictate this note  It may contain errors with dictating incorrect words or incorrect spelling  Please contact the provider directly with any questions  Subjective:      Patient ID: Arturo Barrera is a 53 y o  male  HPI    Pt presents for follow-up and review labs    Lipids are increased  Still taking atorvastatin 20mg daily without side effects  Patient reports that his diet has been slightly worse  He is eating more meat on his salads and more takeout food  Also his activity level has been down with COVID-19 in the community  The 10-year ASCVD risk score (Frida Kern et al , 2013) is: 6 4%    Values used to calculate the score:      Age: 53 years      Sex: Male      Is Non- : No      Diabetic: No      Tobacco smoker: No      Systolic Blood Pressure: 099 mmHg      Is BP treated: No      HDL Cholesterol: 31 mg/dL      Total Cholesterol: 171 mg/dL    Has not gotten his home sleep study completed  Continues with intermittent episodes of witnessed apnea  No worsening daytime fatigue  Patient complains of left knee pain  He has history of an injury to his minute  He reports past few months he has had increased pain with all movements  He also has had periods where he feels that it locks or may give on him  He has been taking over-the-counter Aleve without any improvement in his symptoms  The following portions of the patient's history were reviewed and updated as appropriate: allergies, current medications, past family history, past medical history, past social history, past surgical history and problem list     Review of Systems   Constitutional: Negative for appetite change, chills, fatigue and fever  HENT: Negative for congestion, postnasal drip, sinus pressure, sinus pain and sore throat      Respiratory: Negative for cough, chest tightness, shortness of breath and wheezing  Cardiovascular: Negative for chest pain and palpitations  Gastrointestinal: Negative for abdominal pain, constipation, diarrhea, nausea and vomiting  Genitourinary: Negative for difficulty urinating, flank pain and hematuria  Musculoskeletal: Positive for arthralgias  Negative for back pain and gait problem  Neurological: Negative for dizziness, syncope, light-headedness and headaches  Psychiatric/Behavioral: Negative for dysphoric mood and sleep disturbance  The patient is not nervous/anxious            Past Medical History:   Diagnosis Date    Chronic kidney disease 2017    Hematuria     Hyperlipidemia     Kidney stones 2017    Raynaud's phenomenon     Renal disorder          Current Outpatient Medications:     Multiple Vitamins-Minerals (MULTIVITAL PO), Take by mouth 2 (two) times a day, Disp: , Rfl:     Omega-3 Fatty Acids (FISH OIL) 1000 MG CPDR, Take by mouth daily, Disp: , Rfl: 0    simvastatin (ZOCOR) 40 mg tablet, Take 1 tablet (40 mg total) by mouth daily, Disp: 90 tablet, Rfl: 1    meloxicam (MOBIC) 15 mg tablet, Take 1 tablet (15 mg total) by mouth daily, Disp: 30 tablet, Rfl: 1    Allergies   Allergen Reactions    Prednazoline Dermatitis, Hives, Itching and Rash       Social History   Past Surgical History:   Procedure Laterality Date    CYSTOSCOPY  2017    KIDNEY STONE SURGERY      KNEE ARTHROSCOPY Left 06/06/2005    w/Medial Meniscectomy    [place] CYSTO/URETERO W/LITHOTRIPSY &INDWELL STENT INSRT Right 2/14/2019    Procedure: CYSTOSCOPY URETEROSCOPY WITH LITHOTRIPSY HOLMIUM LASER, RETROGRADE PYELOGRAM AND INSERTION STENT URETERAL, stone basket extraction;  Surgeon: Paola Felix MD;  Location: [address] [place] [address] MAIN OR;  Service: Urology    [place] CYSTO/URETERO/PYELOSCOPY W/LITHOTRIPSY Left 2/9/2017    Procedure: HOLMIUM LASER, URETEROSOCPY ;  Surgeon: Butch Grove MD;  Location:  MAIN OR;  Service: Urology    KS CYSTOURETHROSCOPY,URETER CATHETER Left 2/9/2017    Procedure: CYSTOSCOPY RETROGRADE PYELOGRAM WITH STENT INSERTION;  Surgeon: Brit Reinoso MD;  [facility];  Service: Urology     Family History   Problem Relation Age of Onset    Stroke Mother         CVA    Hypertension Mother         Essential    Hyperlipidemia Mother     Hypertension Father         Essential    Hyperlipidemia Father        Objective:  /68 (BP Location: Right arm, Patient Position: Sitting, Cuff Size: Adult)   Pulse 80   Temp 98 3 °F (36 8 °C) (Tympanic)   Ht 5' 8 5" (1 74 m) Comment: with shoes  Wt 82 5 kg (181 lb 12 8 oz) Comment: with shoes  SpO2 97% Comment: ra  BMI 27 24 kg/m²      Physical Exam  Vitals signs and nursing note reviewed  Constitutional:       General: He is not in acute distress  Appearance: He is well-developed and normal weight  He is not ill-appearing, toxic-appearing or diaphoretic  HENT:      Head: Normocephalic and atraumatic  Neck:      Musculoskeletal: Neck supple  Thyroid: No thyromegaly  Cardiovascular:      Rate and Rhythm: Normal rate and regular rhythm  Pulmonary:      Effort: Pulmonary effort is normal  No respiratory distress  Breath sounds: Normal breath sounds  No wheezing  Skin:     General: Skin is warm and dry  Coloration: Skin is not pale  Neurological:      General: No focal deficit present  Mental Status: He is alert and oriented to person, place, and time  Psychiatric:         Behavior: Behavior normal          Thought Content:  Thought content normal          Judgment: Judgment normal

## 2020-08-14 NOTE — ASSESSMENT & PLAN NOTE
Patient with history left knee torn meniscus  Reports that has been bothering him more recently  No new injury trauma  Will start meloxicam for patient to use daily as needed  Discussed with patient no additional over-the-counter anti-inflammatory medications  Will start physical therapy, x-ray as well as referral to ortho for possible injections

## 2020-08-17 DIAGNOSIS — M25.562 ACUTE PAIN OF LEFT KNEE: Primary | ICD-10-CM

## 2020-08-20 ENCOUNTER — CONSULT (OUTPATIENT)
Dept: OBGYN CLINIC | Facility: HOSPITAL | Age: 55
End: 2020-08-20
Payer: COMMERCIAL

## 2020-08-20 ENCOUNTER — HOSPITAL ENCOUNTER (OUTPATIENT)
Dept: RADIOLOGY | Facility: HOSPITAL | Age: 55
Discharge: HOME | End: 2020-08-20
Attending: ORTHOPAEDIC SURGERY
Payer: COMMERCIAL

## 2020-08-20 VITALS
WEIGHT: 182.6 LBS | DIASTOLIC BLOOD PRESSURE: 76 MMHG | BODY MASS INDEX: 27.05 KG/M2 | HEIGHT: 69 IN | SYSTOLIC BLOOD PRESSURE: 127 MMHG | HEART RATE: 66 BPM

## 2020-08-20 DIAGNOSIS — M25.562 ACUTE PAIN OF LEFT KNEE: Primary | ICD-10-CM

## 2020-08-20 DIAGNOSIS — M23.92 LOCKING OF LEFT KNEE: ICD-10-CM

## 2020-08-20 DIAGNOSIS — M25.562 ACUTE PAIN OF LEFT KNEE: ICD-10-CM

## 2020-08-20 PROCEDURE — 73562 X-RAY EXAM OF KNEE 3: CPT

## 2020-08-20 PROCEDURE — 3008F BODY MASS INDEX DOCD: CPT | Performed by: ORTHOPAEDIC SURGERY

## 2020-08-20 PROCEDURE — 20610 DRAIN/INJ JOINT/BURSA W/O US: CPT | Performed by: ORTHOPAEDIC SURGERY

## 2020-08-20 PROCEDURE — 1036F TOBACCO NON-USER: CPT | Performed by: ORTHOPAEDIC SURGERY

## 2020-08-20 PROCEDURE — 99203 OFFICE O/P NEW LOW 30 MIN: CPT | Performed by: ORTHOPAEDIC SURGERY

## 2020-08-20 RX ORDER — METHYLPREDNISOLONE ACETATE 40 MG/ML
2 INJECTION, SUSPENSION INTRA-ARTICULAR; INTRALESIONAL; INTRAMUSCULAR; SOFT TISSUE
Status: COMPLETED | OUTPATIENT
Start: 2020-08-20 | End: 2020-08-20

## 2020-08-20 RX ORDER — BUPIVACAINE HYDROCHLORIDE 2.5 MG/ML
2 INJECTION, SOLUTION INFILTRATION; PERINEURAL
Status: COMPLETED | OUTPATIENT
Start: 2020-08-20 | End: 2020-08-20

## 2020-08-20 RX ADMIN — BUPIVACAINE HYDROCHLORIDE 2 ML: 2.5 INJECTION, SOLUTION INFILTRATION; PERINEURAL at 08:13

## 2020-08-20 RX ADMIN — METHYLPREDNISOLONE ACETATE 2 ML: 40 INJECTION, SUSPENSION INTRA-ARTICULAR; INTRALESIONAL; INTRAMUSCULAR; SOFT TISSUE at 08:13

## 2020-08-20 NOTE — PROGRESS NOTES
Orthopaedics Office Visit - new Patient Visit    ASSESSMENT/PLAN:    Assessment:   Left knee osteoarthritis, suspected new degenerative medial meniscus tear    Plan:   Weightbearing as tolerated left lower extremity  Left knee intra-articular steroid injection administered today, see details below  Follow-up in 6 weeks for repeat evaluation of left knee, if no improvement, we will obtain MRI and likely will be a candidate for repeat left knee arthroscopic partial meniscectomy, given the patient has minimal medial degenerative changes and symptoms are primarily mechanical in nature  To Do Next Visit:  Evaluate left knee pain, possible knee MRI, possible left knee arthroscopic surgery, alternatively, patient would be a candidate for left knee viscosupplementation injections given well-preserved joint space  _____________________________________________________  CHIEF COMPLAINT:  Chief Complaint   Patient presents with    Left Knee - Pain         SUBJECTIVE:  Leena Gunter is a 53 y o  male who presents with new onset of left knee pain worse in recent months  He has a known history of left knee arthroscopic surgery in 2004, most likely partial meniscectomy but patient is unsure  His symptoms to the left knee currently are clicking, catching, difficulty with stairs, though he does not have much subjective instability  The clicking and catching and mechanical symptoms do cause him left knee pain  He also has pain when sitting for long periods of time in the patellofemoral area  He denies motor sensory deficits  He denies any recent injury  The pain is well localized to the left knee does not radiate proximally distally  She takes Mobic by mouth which provided some mild relief but he does have persistent symptoms  He has not had a left knee corticosteroid injection or viscosupplementation yet      PAST MEDICAL HISTORY:  Past Medical History:   Diagnosis Date    Chronic kidney disease 2017    Hematuria  Hyperlipidemia     Kidney stones 2017    Raynaud's phenomenon     Renal disorder        PAST SURGICAL HISTORY:  Past Surgical History:   Procedure Laterality Date    CYSTOSCOPY  2017    KIDNEY STONE SURGERY      KNEE ARTHROSCOPY Left 06/06/2005    w/Medial Meniscectomy    IL CYSTO/URETERO W/LITHOTRIPSY &INDWELL STENT INSRT Right 2/14/2019    Procedure: CYSTOSCOPY URETEROSCOPY WITH LITHOTRIPSY HOLMIUM LASER, RETROGRADE PYELOGRAM AND INSERTION STENT URETERAL, stone basket extraction;  Surgeon: Sebastian Bright MD;  Location: [facility] MAIN OR;  Service: Urology    IL CYSTO/URETERO/PYELOSCOPY W/LITHOTRIPSY Left 2/9/2017    Procedure: HOLMIUM LASER, URETEROSOCPY ;  Surgeon: Jn Lepe MD;  Location: [facility] MAIN OR;  Service: Urology    IL CYSTOURETHROSCOPY,URETER CATHETER Left 2/9/2017    Procedure: CYSTOSCOPY RETROGRADE PYELOGRAM WITH STENT INSERTION;  Surgeon: Jn Lepe MD;  Location: [facility] MAIN OR;  Service: Urology       FAMILY HISTORY:  Family History   Problem Relation Age of Onset    Stroke Mother         CVA    Hypertension Mother         Essential    Hyperlipidemia Mother     Hypertension Father         Essential    Hyperlipidemia Father        SOCIAL HISTORY:  Social History     Tobacco Use    Smoking status: Never Smoker    Smokeless tobacco: Never Used   Substance Use Topics    Alcohol use:  Yes     Alcohol/week: 2 0 standard drinks     Types: 2 Shots of liquor per week     Comment: Social    Drug use: No       MEDICATIONS:    Current Outpatient Medications:     meloxicam (MOBIC) 15 mg tablet, Take 1 tablet (15 mg total) by mouth daily, Disp: 30 tablet, Rfl: 1    Multiple Vitamins-Minerals (MULTIVITAL PO), Take by mouth 2 (two) times a day, Disp: , Rfl:     Omega-3 Fatty Acids (FISH OIL) 1000 MG CPDR, Take by mouth daily, Disp: , Rfl: 0    simvastatin (ZOCOR) 40 mg tablet, Take 1 tablet (40 mg total) by mouth daily, Disp: 90 tablet, Rfl: 1    ALLERGIES:  Allergies   Allergen Reactions  Prednazoline Dermatitis, Hives, Itching and Rash       REVIEW OF SYSTEMS:  MSK: Left knee pain, mechanical symptoms  Neuro: Negative  Pertinent items are otherwise noted in HPI  A comprehensive review of systems was otherwise negative  LABS:  HgA1c:   Lab Results   Component Value Date    HGBA1C 5 7 06/16/2018     BMP:   Lab Results   Component Value Date    CALCIUM 9 0 08/13/2020    K 5 0 08/13/2020    CO2 30 08/13/2020     08/13/2020    BUN 14 08/13/2020    CREATININE 1 08 08/13/2020     CBC: No components found for: CBC    _____________________________________________________  PHYSICAL EXAMINATION:  Vital signs: /76   Pulse 66   Ht 5' 8 5" (1 74 m)   Wt 82 8 kg (182 lb 9 6 oz)   BMI 27 36 kg/m²   General: No acute distress, awake and alert  Psychiatric: Mood and affect appear appropriate  HEENT: Trachea Midline, No torticollis, no apparent facial trauma  Cardiovascular: No audible murmurs; Extremities appear perfused  Pulmonary: No audible wheezing or stridor  Skin: No open lesions; see further details (if any) below    MUSCULOSKELETAL EXAMINATION:  Extremities:  LLE:  Medial joint line tenderness posteromedially present  + ankle df/pf, ehl/fhl motor functions  Sensation intact sp/dp/t/s/s distribution  Extremity warm/well perfused  Positive Mariely's exam reproducing medial pain  Negative Lachman, negative posterior drawer exam      _____________________________________________________  STUDIES REVIEWED:  Plain films of left knee reveal medial and patellofemoral joint space narrowing  No fracture or dislocation      PROCEDURES PERFORMED:  Large joint arthrocentesis: L knee  Date/Time: 8/20/2020 8:13 AM  Consent given by: patient  Supporting Documentation  Indications: pain   Procedure Details  Location: knee - L knee  Preparation: betadine stick and alcohol    Needle size: 22 G  Approach: anterolateral  Medications administered: 2 mL bupivacaine 0 25 %; 2 mL methylPREDNISolone acetate 40 mg/mL    Patient tolerance: patient tolerated the procedure well with no immediate complications  Dressing:  Sterile dressing applied          Ricki Stafford MD

## 2020-08-20 NOTE — PATIENT INSTRUCTIONS
Weightbearing as tolerated left lower extremity  Follow-up in 6 weeks for repeat evaluation  Maintain physical therapy appointment

## 2020-08-24 ENCOUNTER — EVALUATION (OUTPATIENT)
Dept: PHYSICAL THERAPY | Age: 55
End: 2020-08-24
Payer: COMMERCIAL

## 2020-08-24 DIAGNOSIS — M25.562 ACUTE PAIN OF LEFT KNEE: Primary | ICD-10-CM

## 2020-08-24 PROCEDURE — 97161 PT EVAL LOW COMPLEX 20 MIN: CPT | Performed by: PHYSICAL THERAPIST

## 2020-08-24 PROCEDURE — 97110 THERAPEUTIC EXERCISES: CPT | Performed by: PHYSICAL THERAPIST

## 2020-08-24 NOTE — PROGRESS NOTES
PT Evaluation     Today's date: 2020  Patient name: Flaquita Raygoza  : 1965  MRN: [id number]  Referring provider: Teagan Sullivan MD  Dx:   Encounter Diagnosis     ICD-10-CM    1  Acute pain of left knee  M25 562 Ambulatory referral to Physical Therapy                  Assessment  Assessment details: Patient is 53y/o M presenting to physical therapy w/ complaint of L knee pain s/p L medial meniscectomy  Patient's pain is likely secondary to L meniscectomy in  w/ decreased use of the L leg over time, leading to weakness, further contributing to patient's pain complaints  Patient will benefit from physical therapy to address patient's strength deficits leading to restrictions in walking, stair navigation, and kneeling  Please contact me if you have any questions or recommendations  Thank you for the opportunity to share in  Artie's care  Impairments: abnormal muscle firing, impaired physical strength, lacks appropriate home exercise program, pain with function and poor body mechanics    Symptom irritability: moderateUnderstanding of Dx/Px/POC: good   Prognosis: good    Plan  Patient would benefit from: skilled physical therapy  Planned modality interventions: thermotherapy: hydrocollator packs  Planned therapy interventions: home exercise program, therapeutic exercise, strengthening, therapeutic activities and neuromuscular re-education  Frequency: 2x week  Duration in weeks: 8  Treatment plan discussed with: patient        Subjective Evaluation    History of Present Illness  Mechanism of injury: Patient reports pain on the inside of his knee, popping and locking if he twists his knee or stands from sitting  Tore medial meniscus and had L medial meniscectomy in   Recovery from this was good and as time has gone by it has been worsening  Patient points to medial joint line as location of sharp pain  Aggravating factors: stairs, pivoting, standing from couch, prolonged walking   Locking lasts 30 sec before he can move it again  Patient's knee has buckled in the past with last occurrence 1 year ago  Patient had steroid injection on Friday and pain has been better since then, mobic has been helping as well  Patient doesn't expect 100% pain relief but would like reduced pain w/ stair navigation, prolonged walking, going from sitting to standing  Stair navigation depends on how bad the pain is  Clicking/popping not painful  Patient denies pain w/ squatting  Patient has difficulty w/ kneeling as well  Recurrent probem    Quality of life: good    Pain  Current pain ratin  At best pain ratin  At worst pain ratin  Quality: sharp  Aggravating factors: stair climbing, standing and walking  Progression: worsening (better since shot but in general worse)    Social Support  Steps to enter house: yes (1)  Stairs in house: yes (12)     Treatments  Previous treatment: injection treatment  Current treatment: injection treatment  Patient Goals  Patient goals for therapy: increased strength, independence with ADLs/IADLs, increased motion and decreased pain          Objective    GAIT: WNL  Squat assess: unable to rise from squat w/ weight on L  ¼ squat assess: n/t  SLS: RLE: dec stability, LLE: WNL           MMT          PROM    Hip         R          L          R         L   Flex  5 5     Extn  5 4     Abd  5 5     Add  4+ 4     IR  WNL WNL   ER  WNL WNL          G  Max 5 4                   MMT         AROM         PROM    Knee      R          L         R          L           R         L   Flex  5 4 WNL WNL WNL WNL   Extn  5 4 WNL WNL WNL WNL                         MMT    Ankle         R          L   PF 5 5   DF  5 4   EHL 5 5     Palpation: medial joint line  Straight leg raise: no quad lag, good quad set       Knee: post drawer = -  Lachmans test= n/t  anterior draw test= -   Valgus stress test= + varus stress test = -   Mariely test =  -  Patella grind test= n/t    Patella mobility test= n/t [place]s test=  n/t Thessaly= + L  apprehension test= n/t   joint findings= mobility normal in all joints in all directions  Precautions: none  Patient co-treated by Physical Therapy Student, Kentrell Garcia, under my direct supervision  Patient provided verbal consent to treatment plan and recommended interventions  Manuals                                                                 Neuro Re-Ed                                                                                                        Ther Ex             LAQ blacktb 3x10            Hamstring curls Blacktb 3x10            Prone hip ext 3x10            S/l hip add 3x10            Step ups 3x10, 6"                                                   Ther Activity                                       Gait Training                                       Modalities                                         Short Term:  1  Pt will report decreased levels of pain by at least 2 subjective ratings in 4 weeks  2  Pt will demonstrate improved strength by 1/2 grade in 4 weeks  3  Pt will be able to navigate stairs w/ reciprocal pattern in 4 weeks  Long Term:   1  Pt will be independent in their HEP in 8 weeks  2  Pt will be pain free with IADL's in 8 weeks  3  Pt will demonstrate improved FOTO, > 12 in 8 weeks    4  Pt will be able to walk >20 min w/o pain in 8 weeks

## 2020-08-27 ENCOUNTER — OFFICE VISIT (OUTPATIENT)
Dept: PHYSICAL THERAPY | Age: 55
End: 2020-08-27
Payer: COMMERCIAL

## 2020-08-27 DIAGNOSIS — M25.562 ACUTE PAIN OF LEFT KNEE: Primary | ICD-10-CM

## 2020-08-27 PROCEDURE — 97110 THERAPEUTIC EXERCISES: CPT | Performed by: PHYSICAL THERAPIST

## 2020-08-27 NOTE — PROGRESS NOTES
Daily Note     Today's date: 2020  Patient name: Aliyah Merino  : 1965  MRN: [id number]  Referring provider: Yvette Maldonado MD  Dx:   Encounter Diagnosis     ICD-10-CM    1  Acute pain of left knee  M25 562        Start Time: 1615  Stop Time: 1700  Total time in clinic (min): 45 minutes  Subjective: Patient reports no issues w/ home exercises and knee feels good  Objective: See treatment diary below    Assessment: Tolerated treatment fair  Patient demonstrated fatigue post treatment  Patient had pain w/ heel taps, required cuing to sit back and pain was relieved  Patient able to perform leg press w/ no pain, went w/ high rep scheme to maximize muscle strength and minimize stress on bony structures due to hx of meniscal issues  Plan: Progress treatment as tolerated         Manuals                                                                                                                       Neuro Re-Ed                                                                                                                                                                                               Ther Ex                       LAQ blacktb 3x10 3*10 black- HEP                   Hamstring curls Blacktb 3x10 3*10 black- HEP                   Prone hip ext 3x10 3*10 black- HEP                   S/l hip add 3x10  3*10 black- HEP                   Step ups 3x10, 6"  3*10 black- HEP                    SLR    3x10x5"                    heel tap    lvl ground 3x10                    lateral step down    6" 3x10                   Leg press B/L  3x20 140#            Leg press L  3*20, 95#           Ther Activity                                                                       Gait Training                                                                       Modalities

## 2020-09-01 ENCOUNTER — OFFICE VISIT (OUTPATIENT)
Dept: PHYSICAL THERAPY | Age: 55
End: 2020-09-01
Payer: COMMERCIAL

## 2020-09-01 DIAGNOSIS — M25.562 ACUTE PAIN OF LEFT KNEE: Primary | ICD-10-CM

## 2020-09-01 PROCEDURE — 97110 THERAPEUTIC EXERCISES: CPT

## 2020-09-01 NOTE — PROGRESS NOTES
Daily Note     Today's date: 2020  Patient name: Lashell James  : 1965  MRN: [id number]  Referring provider: Zeyad Griffin MD  Dx:   Encounter Diagnosis     ICD-10-CM    1  Acute pain of left knee  M25 562                   Subjective:  Pt reports L knee feels better since injection, pt reports less pain noted with txfer from couch, prolonged walking, hasn't tried kneeling yet      Objective: See treatment diary below      Assessment:  Improved neuromuscular control noted with eccentric lower lat step down, some cueing required for technique, HEP compliance noted      Plan: Continue per plan of care  Progress treatment as tolerated         Manuals      20                                                                                                                 Neuro Re-Ed                                                                                                                                                                                               Ther Ex    20                 , ham cur, prone hip extn, S/L hip add, step ups blacktb 3x10 3*10 black- HEP                    SLR    3x10x5"  3x12                  heel tap    lvl ground 3x10  lvl ground 3x10                  lateral step down    6" 3x10  6" 2x20                 Leg press B/L  3x20 140#  140# 3x20          Leg press L  3*20, 95# 95# 3x20          Upright bike st #6   8' lvl 6                                                              Ther Activity                                                                       Gait Training                                                                       Modalities

## 2020-09-03 ENCOUNTER — OFFICE VISIT (OUTPATIENT)
Dept: PHYSICAL THERAPY | Age: 55
End: 2020-09-03
Payer: COMMERCIAL

## 2020-09-03 DIAGNOSIS — M25.562 ACUTE PAIN OF LEFT KNEE: Primary | ICD-10-CM

## 2020-09-03 PROCEDURE — 97110 THERAPEUTIC EXERCISES: CPT

## 2020-09-03 NOTE — PROGRESS NOTES
Daily Note     Today's date: 9/3/2020  Patient name: Toi Rogers  : 1965  MRN: [id number]  Referring provider: Dahlia Mcknight MD  Dx:   Encounter Diagnosis     ICD-10-CM    1  Acute pain of left knee  M25 562                   Subjective:  Pt reports knee feels about the same since last visit, just some stiffness past 2 days      Objective: See treatment diary below      Assessment: ex progressions shaheed well, min valgus noted during heel tap, somewhat corrected with verbal cues      Plan: Continue per plan of care  Progress treatment as tolerated         Manuals      9/1/20  9/3/20                                                                                                               Neuro Re-Ed                                                                                                                                                                                               Ther Ex    8/27  9/1/20  9/3/20               , ham cur, prone hip extn, S/L hip add, step ups blacktb 3x10 3*10 black- HEP                    SLR    3x10x5"  4X36  0S20                heel tap    lvl ground 3x10  lvl ground 3x10  lvl ground 3x10                lateral step down    6" 3x10  6" 2x20  6" 2x20               Leg press B/L  3x20 140#  140# 3x20 155# 2x10         Leg press L  3*20, 95# 95# 3x20 95# 2x10         Upright bike st #6   8' lvl 6 8' lvl 6                                                             Ther Activity                                                                       Gait Training                                                                       Modalities

## 2020-09-08 ENCOUNTER — OFFICE VISIT (OUTPATIENT)
Dept: PHYSICAL THERAPY | Age: 55
End: 2020-09-08
Payer: COMMERCIAL

## 2020-09-08 DIAGNOSIS — M25.562 ACUTE PAIN OF LEFT KNEE: Primary | ICD-10-CM

## 2020-09-08 PROCEDURE — 97110 THERAPEUTIC EXERCISES: CPT

## 2020-09-08 NOTE — PROGRESS NOTES
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 over the weekend  Objective: See treatment diary below      Assessment: Tolerated treatment fair  VC for patient to correct form with performing added exercises  Patient was challenged with added exercises today most challenged with standing stability exercises with RTB  Patient noted some discomfort pain with standing add position with RTB  Also added seated long sit  knee bent opposite leg straight ER to 20 degrees and lift leg off table in seated position  Patient noted muscle soreness post treatment  Patient would benefit from continued PT      Plan: Continue per plan of care        Manuals      9/1/20  9/3/20  09/8                                                                                                             Neuro Re-Ed                                                                                                                                                                                               Ther Ex    8/27  9/1/20  9/3/20  09/8             , ham cur, prone hip extn, S/L hip add, step ups blacktb 3x10 3*10 black- HEP                    SLR    3x10x5"  3x12  3x12  3x12              heel tap    lvl ground 3x10  lvl ground 3x10  lvl ground 3x10  lvl ground 3x10              lateral step down    6" 3x10  6" 2x20  6" 2x20  6" 2x20             Leg press B/L  3x20 140#  140# 3x20 155# 2x10 155# 2x10        Leg press L  3*20, 95# 95# 3x20 95# 2x10 95# 2x10        Upright bike st #6   8' lvl 6 8' lvl 6 8' lvl 6        Bridges with Pballl roll     3"x 2x10        Standing RTB around R ankle holds     30"x2 stability 4 way        Long sit     5"x10 Ther Activity                                                                       Gait Training                                                                       Modalities

## 2020-09-10 ENCOUNTER — OFFICE VISIT (OUTPATIENT)
Dept: PHYSICAL THERAPY | Age: 55
End: 2020-09-10
Payer: COMMERCIAL

## 2020-09-10 DIAGNOSIS — M25.562 ACUTE PAIN OF LEFT KNEE: Primary | ICD-10-CM

## 2020-09-10 PROCEDURE — 97110 THERAPEUTIC EXERCISES: CPT | Performed by: PHYSICAL THERAPIST

## 2020-09-10 NOTE — PROGRESS NOTES
Daily Note     Today's date: 9/10/2020  Patient name: Marcia Andersen  : 1965  MRN: [id number]  Referring provider: Anjana Cantrell MD  Dx:   Encounter Diagnosis     ICD-10-CM    1  Acute pain of left knee  M25 562          Subjective: Patient reported some knee soreness following last visit  Reports increased level of activity with less irritation  Objective: See treatment diary below    Assessment: Held on static hold abd as this led to some irritation following last visit so did hip abd instead  Patient tolerated progressions well w/ minimal discomfort  In future visit will continue to progress exercises as patient is able, w/ no increase in pain or discomfort  Patient reports some muscle fatigue but no joint soreness following treatment today  Plan: Continue per plan of care        Manuals      9/1/20  9/3/20  09/8  9/10                                                           Neuro Re-Ed                                                                       Ther Ex    8/27  9/1/20  9/3/20  09/8  9/10           , ham cur, prone hip extn, S/L hip add, step ups blacktb 3x10 3*10 black- HEP                    SLR    3x10x5"  3x12  3x12  3x12  3x12 1#            heel tap    lvl ground 3x10  lvl ground 3x10  lvl ground 3x10  lvl ground 3x10  lvl ground 3x15            lateral step down    6" 3x10  6" 2x20  6" 2x20  6" 2x20  6" 4x10           Leg press B/L  3x20 140#  140# 3x20 155# 2x10 155# 2x10 155# 3x12       Leg press L  3*20, 95# 95# 3x20 95# 2x10 95# 2x10 95# 3x12       Upright bike st #6   8' lvl 6 8' lvl 6 8' lvl 6 8'       Bridges with Pballl roll     3"x 2x10 3x10       Standing RTB around R ankle holds     30"x2 stability 4 way np       Long sit     5"x10 10x5"       Standing hip abd      rtb 3x12       Ther Activity                                                                       Gait Training                                                                       Modalities

## 2020-09-15 ENCOUNTER — OFFICE VISIT (OUTPATIENT)
Dept: PHYSICAL THERAPY | Age: 55
End: 2020-09-15
Payer: COMMERCIAL

## 2020-09-15 DIAGNOSIS — M25.562 ACUTE PAIN OF LEFT KNEE: Primary | ICD-10-CM

## 2020-09-15 PROCEDURE — 97110 THERAPEUTIC EXERCISES: CPT

## 2020-09-15 NOTE — PROGRESS NOTES
Daily Note     Today's date: 9/15/2020  Patient name: Savi Jaffe  : 1965  MRN: [id number]  Referring provider: Hortencia Fang MD  Dx:   Encounter Diagnosis     ICD-10-CM    1  Acute pain of left knee  M25 562                   Subjective:  Pt reports he had increased pain in L medial knee over the weekend, but hadn't increased his activities, feels better today "I think I was laying with the leg in a bad position "      Objective: See treatment diary below      Assessment:  Some soreness noted after today's session, will progress balance/proprio ex as shaheed      Plan: Continue per plan of care  Progress treatment as tolerated         Manuals      9/1/20  9/3/20  09/8  9/10  9/15/20                                                   Neuro Re-Ed                                                                 Ther Ex    8/27  9/1/20  9/3/20  09/8  9/10  9/15/20       , ham cur, prone hip extn, S/L hip add, step ups blacktb 3x10 3*10 black- HEP                  SLR    3x10x5"  3x12  3x12  3x12  3x12 1#  1# 3x12        heel tap    lvl ground 3x10  lvl ground 3x10  lvl ground 3x10  lvl ground 3x10  lvl ground 3x15  lvl ground 3x15        lateral step down    6" 3x10  6" 2x20  6" 2x20  6" 2x20  6" 4x10  6" 3x10       Leg press B/L  3x20 140#  140# 3x20 155# 2x10 155# 2x10 155# 3x12 155# 3x12     Leg press L  3*20, 95# 95# 3x20 95# 2x10 95# 2x10 95# 3x12 95# 3x12     Upright bike st #6   8' lvl 6 8' lvl 6 8' lvl 6 8' 10'     Bridges with Pballl roll     3"x 2x10 3x10 3x10     Standing RTB around R ankle holds     30"x2 stability 4 way np grn tubing 2x30" ea     Long sit     5"x10 10x5" np     Standing hip abd      rtb 3x12 rtb 3x10     Ther Activity                                                                 Gait Training                                                                 Modalities

## 2020-09-17 ENCOUNTER — OFFICE VISIT (OUTPATIENT)
Dept: PHYSICAL THERAPY | Age: 55
End: 2020-09-17
Payer: COMMERCIAL

## 2020-09-17 DIAGNOSIS — M25.562 ACUTE PAIN OF LEFT KNEE: Primary | ICD-10-CM

## 2020-09-17 DIAGNOSIS — Z12.11 SCREEN FOR COLON CANCER: Primary | ICD-10-CM

## 2020-09-17 PROCEDURE — 97110 THERAPEUTIC EXERCISES: CPT

## 2020-09-17 NOTE — PROGRESS NOTES
Daily Note     Today's date: 2020  Patient name: Korey Garcia  : 1965  MRN: [id number]  Referring provider: Jo Moreland MD  Dx:   Encounter Diagnosis     ICD-10-CM    1  Acute pain of left knee  M25 562                   Subjective:  Pt reports Lknee ok but hips and back bothersome pt relates this to wrong sleeping positions      Objective: See treatment diary below      Assessment:  Will hold PB hams curls due to subjective report of back/hip pain, modified with sitting ham TB curls    Plan: Continue per plan of care  Progress treatment as tolerated         Manuals      9/1/20  9/3/20  09/8  9/10  9/15/20  9/17/20                                                 Neuro Re-Ed                      SLS on level                3x30" EO                           Ther Ex    8/27  9/1/20  9/3/20  09/8  9/10  9/15/20  9/17/20     , ham cur, prone hip extn, S/L hip add, step ups blacktb 3x10 3*10 black- HEP                  SLR    3x10x5"  3x12  3x12  3x12  3x12 1#  1# 3x12  1# 3x12      heel tap    lvl ground 3x10  lvl ground 3x10  lvl ground 3x10  lvl ground 3x10  lvl ground 3x15  lvl ground 3x15  lvl ground 3x10      lateral step down    6" 3x10  6" 2x20  6" 2x20  6" 2x20  6" 4x10  6" 3x10       Leg press B/L  3x20 140#  140# 3x20 155# 2x10 155# 2x10 155# 3x12 155# 3x12 155# 3x12    Leg press L  3*20, 95# 95# 3x20 95# 2x10 95# 2x10 95# 3x12 95# 3x12 95# 3x12    Upright bike st #6   8' lvl 6 8' lvl 6 8' lvl 6 8' 10' 8'    Bridges with Pballl roll     3"x 2x10 3x10 3x10 np    Standing RTB around R ankle holds     30"x2 stability 4 way np grn tubing 2x30" ea grn 2x30" ea    Long sit     5"x10 10x5" np     Standing hip abd      rtb 3x12 rtb 3x10     Sitting hams curls        rtb 2x15    Ther Activity                                                                 Gait Training                                                                 Modalities

## 2020-09-22 ENCOUNTER — OFFICE VISIT (OUTPATIENT)
Dept: PHYSICAL THERAPY | Age: 55
End: 2020-09-22
Payer: COMMERCIAL

## 2020-09-22 DIAGNOSIS — M25.562 ACUTE PAIN OF LEFT KNEE: Primary | ICD-10-CM

## 2020-09-22 PROCEDURE — 97110 THERAPEUTIC EXERCISES: CPT

## 2020-09-22 NOTE — PROGRESS NOTES
Daily Note     Today's date: 2020  Patient name: Florentino Hopper   : 1965  MRN: [id number]  Referring provider: Fatimah Alexander MD  Dx:   Encounter Diagnosis     ICD-10-CM    1  Acute pain of left knee  M25 562                   Subjective: pt reports L knee sore today " It felt sore when I stood up after sitting for a while, didn't do anything out of the ordinary "      Objective: See treatment diary below      Assessment: pt able to complete there ex with min discomfort, no significant antalgia noted during amb, improved control noted with heel tap on level      Plan: Continue per plan of care  Progress treatment as tolerated         Manuals      9/1/20  9/3/20  09/8  9/10  9/15/20  9/17/20  9/22/20                                               Neuro Re-Ed                      SLS on level                3x30" EO Foam 3x30"                         Ther Ex    8/27  9/1/20  9/3/20  09/8  9/10  9/15/20  9/17/20 9/22/20   , ham cur, prone hip extn, S/L hip add, step ups blacktb 3x10 3*10 black- HEP                  SLR    3x10x5"  3x12  3x12  3x12  3x12 1#  1# 3x12  1# 3x12  1# 3x12    heel tap    lvl ground 3x10  lvl ground 3x10  lvl ground 3x10  lvl ground 3x10  lvl ground 3x15  lvl ground 3x15  lvl ground 3x10  lvl ground 3x10    lateral step down    6" 3x10  6" 2x20  6" 2x20  6" 2x20  6" 4x10  6" 3x10       Leg press B/L  3x20 140#  140# 3x20 155# 2x10 155# 2x10 155# 3x12 155# 3x12 155# 3x12 170# 2x10   Leg press L  3*20, 95# 95# 3x20 95# 2x10 95# 2x10 95# 3x12 95# 3x12 95# 3x12 95# 3x10   Upright bike st #6   8' lvl 6 8' lvl 6 8' lvl 6 8' 10' 8' 8'   Bridges with Pballl roll     3"x 2x10 3x10 3x10 np    Standing RTB around R ankle holds     30"x2 stability 4 way np grn tubing 2x30" ea grn 2x30" ea grn 2x30 ea   Long sit     5"x10 10x5" np     Standing hip abd      rtb 3x12 rtb 3x10  rtb 3x10 L LE   Sitting hams curls        rtb 2x15 gtb 3x10x3"   Ther Activity                                                               Gait Training                                                                 Modalities

## 2020-09-24 ENCOUNTER — APPOINTMENT (OUTPATIENT)
Dept: PHYSICAL THERAPY | Age: 55
End: 2020-09-24
Payer: COMMERCIAL

## 2020-09-29 ENCOUNTER — OFFICE VISIT (OUTPATIENT)
Dept: PHYSICAL THERAPY | Age: 55
End: 2020-09-29
Payer: COMMERCIAL

## 2020-09-29 DIAGNOSIS — M25.562 ACUTE PAIN OF LEFT KNEE: Primary | ICD-10-CM

## 2020-09-29 PROCEDURE — 97110 THERAPEUTIC EXERCISES: CPT

## 2020-09-29 NOTE — PROGRESS NOTES
Daily Note     Today's date: 2020  Patient name: Brittany Talley  : 1965  MRN: [id number]  Referring provider: Rosa Jamison MD  Dx:   Encounter Diagnosis     ICD-10-CM    1  Acute pain of left knee  M25 562                   Subjective:  Pt reports L knee is sore due to slipped coming down ladder while washing car, "feels swollen under the knee cap, I see the Dr darek Velazquez" pt reports pain is primarily with rotational movements, OK straight plane    Objective: See treatment diary below      Assessment: mild antalgic gait noted today, instructed pt to avoid pivoting , min swelling noted L knee    Plan: Continue per plan of care  Progress treatment as tolerated         Manuals      9/1/20  9/3/20  09/8  9/10  9/15/20  9/17/20  9/22/20 9/29/20                                                 Neuro Re-Ed                       SLS on level                3x30" EO Foam 3x30" np                          Ther Ex    8/27  9/1/20  9/3/20  09/8  9/10  9/15/20  9/17/20 9/22/20 9/29/20   , ham cur, prone hip extn, S/L hip add, step ups blacktb 3x10 3*10 black- HEP                   SLR    3x10x5"  3x12  3x12  3x12  3x12 1#  1# 3x12  1# 3x12  1# 3x12 1#3x10    heel tap    lvl ground 3x10  lvl ground 3x10  lvl ground 3x10  lvl ground 3x10  lvl ground 3x15  lvl ground 3x15  lvl ground 3x10  lvl ground 3x10 lvl ground 3x10    lateral step down    6" 3x10  6" 2x20  6" 2x20  6" 2x20  6" 4x10  6" 3x10        Leg press B/L  3x20 140#  140# 3x20 155# 2x10 155# 2x10 155# 3x12 155# 3x12 155# 3x12 170# 2x10 140# 3x10   Leg press L  3*20, 95# 95# 3x20 95# 2x10 95# 2x10 95# 3x12 95# 3x12 95# 3x12 95# 3x10 75# 3x10   Upright bike st #6   8' lvl 6 8' lvl 6 8' lvl 6 8' 10' 8' 8' 10'   Bridges with Pballl roll     3"x 2x10 3x10 3x10 np     Standing RTB around R ankle holds     30"x2 stability 4 way np grn tubing 2x30" ea grn 2x30" ea grn 2x30 ea gtb 2x30" ea   Long sit     5"x10 10x5" np      Standing hip abd      rtb 3x12 rtb 3x10 rtb 3x10 L LE rtb 3x20   Sitting hams curls        rtb 2x15 gtb 3x10x3" gtb 3x10x3"   Ther Activity                                                                    Gait Training                                                                    Modalities

## 2020-10-01 ENCOUNTER — OFFICE VISIT (OUTPATIENT)
Dept: PHYSICAL THERAPY | Age: 55
End: 2020-10-01
Payer: COMMERCIAL

## 2020-10-01 ENCOUNTER — OFFICE VISIT (OUTPATIENT)
Dept: OBGYN CLINIC | Facility: HOSPITAL | Age: 55
End: 2020-10-01
Payer: COMMERCIAL

## 2020-10-01 VITALS
SYSTOLIC BLOOD PRESSURE: 135 MMHG | DIASTOLIC BLOOD PRESSURE: 82 MMHG | BODY MASS INDEX: 26.96 KG/M2 | WEIGHT: 182 LBS | HEART RATE: 76 BPM | HEIGHT: 69 IN

## 2020-10-01 DIAGNOSIS — M25.562 MECHANICAL KNEE PAIN, LEFT: Primary | ICD-10-CM

## 2020-10-01 DIAGNOSIS — M25.562 ACUTE PAIN OF LEFT KNEE: Primary | ICD-10-CM

## 2020-10-01 PROCEDURE — 97110 THERAPEUTIC EXERCISES: CPT

## 2020-10-01 PROCEDURE — 99213 OFFICE O/P EST LOW 20 MIN: CPT | Performed by: ORTHOPAEDIC SURGERY

## 2020-10-07 ENCOUNTER — HOSPITAL ENCOUNTER (OUTPATIENT)
Dept: RADIOLOGY | Facility: HOSPITAL | Age: 55
Discharge: HOME | End: 2020-10-07
Payer: COMMERCIAL

## 2020-10-07 ENCOUNTER — HOSPITAL ENCOUNTER (OUTPATIENT)
Dept: RADIOLOGY | Facility: HOSPITAL | Age: 55
Discharge: HOME | End: 2020-10-07

## 2020-10-07 DIAGNOSIS — M25.562 MECHANICAL KNEE PAIN, LEFT: ICD-10-CM

## 2020-10-07 DIAGNOSIS — T15.90XA FOREIGN BODY ACCIDENTALLY ENTERING EYE AND ADNEXA: ICD-10-CM

## 2020-10-07 PROCEDURE — G1004 CDSM NDSC: HCPCS

## 2020-10-07 PROCEDURE — 73721 MRI JNT OF LWR EXTRE W/O DYE: CPT

## 2020-10-07 PROCEDURE — 70030 X-RAY EYE FOR FOREIGN BODY: CPT

## 2020-10-14 ENCOUNTER — OFFICE VISIT (OUTPATIENT)
Dept: OBGYN CLINIC | Facility: HOSPITAL | Age: 55
End: 2020-10-14
Payer: COMMERCIAL

## 2020-10-14 VITALS
WEIGHT: 185 LBS | BODY MASS INDEX: 27.4 KG/M2 | HEIGHT: 69 IN | SYSTOLIC BLOOD PRESSURE: 136 MMHG | DIASTOLIC BLOOD PRESSURE: 79 MMHG | HEART RATE: 77 BPM

## 2020-10-14 DIAGNOSIS — G89.29 CHRONIC PAIN OF LEFT KNEE: ICD-10-CM

## 2020-10-14 DIAGNOSIS — M25.562 CHRONIC PAIN OF LEFT KNEE: ICD-10-CM

## 2020-10-14 DIAGNOSIS — M23.92 INTERNAL DERANGEMENT OF LEFT KNEE: ICD-10-CM

## 2020-10-14 DIAGNOSIS — Z98.890 HISTORY OF ARTHROSCOPY OF LEFT KNEE: ICD-10-CM

## 2020-10-14 DIAGNOSIS — M17.12 PRIMARY OSTEOARTHRITIS OF LEFT KNEE: Primary | ICD-10-CM

## 2020-10-14 PROCEDURE — 99213 OFFICE O/P EST LOW 20 MIN: CPT | Performed by: ORTHOPAEDIC SURGERY

## 2020-10-14 PROCEDURE — S0020 INJECTION, BUPIVICAINE HYDRO: HCPCS | Performed by: ORTHOPAEDIC SURGERY

## 2020-10-14 PROCEDURE — 1036F TOBACCO NON-USER: CPT | Performed by: ORTHOPAEDIC SURGERY

## 2020-10-14 PROCEDURE — 20610 DRAIN/INJ JOINT/BURSA W/O US: CPT | Performed by: ORTHOPAEDIC SURGERY

## 2020-10-14 RX ORDER — METHYLPREDNISOLONE ACETATE 40 MG/ML
2 INJECTION, SUSPENSION INTRA-ARTICULAR; INTRALESIONAL; INTRAMUSCULAR; SOFT TISSUE
Status: COMPLETED | OUTPATIENT
Start: 2020-10-14 | End: 2020-10-14

## 2020-10-14 RX ORDER — BUPIVACAINE HYDROCHLORIDE 2.5 MG/ML
2 INJECTION, SOLUTION INFILTRATION; PERINEURAL
Status: COMPLETED | OUTPATIENT
Start: 2020-10-14 | End: 2020-10-14

## 2020-10-14 RX ADMIN — METHYLPREDNISOLONE ACETATE 2 ML: 40 INJECTION, SUSPENSION INTRA-ARTICULAR; INTRALESIONAL; INTRAMUSCULAR; SOFT TISSUE at 09:04

## 2020-10-14 RX ADMIN — BUPIVACAINE HYDROCHLORIDE 2 ML: 2.5 INJECTION, SOLUTION INFILTRATION; PERINEURAL at 09:04

## 2020-10-15 ENCOUNTER — TELEPHONE (OUTPATIENT)
Dept: OBGYN CLINIC | Facility: HOSPITAL | Age: 55
End: 2020-10-15

## 2020-11-20 ENCOUNTER — TELEPHONE (OUTPATIENT)
Dept: OBGYN CLINIC | Facility: HOSPITAL | Age: 55
End: 2020-11-20

## 2021-01-01 ENCOUNTER — IMMUNIZATIONS (OUTPATIENT)
Dept: FAMILY MEDICINE CLINIC | Facility: HOSPITAL | Age: 56
End: 2021-01-01

## 2021-01-01 ENCOUNTER — OFFICE VISIT (OUTPATIENT)
Dept: INTERNAL MEDICINE CLINIC | Facility: CLINIC | Age: 56
End: 2021-01-01
Payer: COMMERCIAL

## 2021-01-01 ENCOUNTER — APPOINTMENT (OUTPATIENT)
Dept: LAB | Age: 56
End: 2021-01-01

## 2021-01-01 VITALS
SYSTOLIC BLOOD PRESSURE: 117 MMHG | HEART RATE: 64 BPM | DIASTOLIC BLOOD PRESSURE: 80 MMHG | TEMPERATURE: 98.4 F | BODY MASS INDEX: 26.43 KG/M2 | WEIGHT: 174.4 LBS | HEIGHT: 68 IN | OXYGEN SATURATION: 98 %

## 2021-01-01 DIAGNOSIS — E78.00 HYPERCHOLESTEROLEMIA: ICD-10-CM

## 2021-01-01 DIAGNOSIS — Z12.11 ENCOUNTER FOR SCREENING FOR MALIGNANT NEOPLASM OF COLON: Primary | ICD-10-CM

## 2021-01-01 DIAGNOSIS — E03.8 SUBCLINICAL HYPOTHYROIDISM: ICD-10-CM

## 2021-01-01 DIAGNOSIS — Z23 ENCOUNTER FOR IMMUNIZATION: Primary | ICD-10-CM

## 2021-01-01 DIAGNOSIS — Z00.8 HEALTH EXAMINATION IN POPULATION SURVEYS: ICD-10-CM

## 2021-01-01 LAB
CHOLEST SERPL-MCNC: 166 MG/DL (ref 50–200)
EST. AVERAGE GLUCOSE BLD GHB EST-MCNC: 100 MG/DL
HBA1C MFR BLD: 5.1 %
HDLC SERPL-MCNC: 33 MG/DL
LDLC SERPL CALC-MCNC: 88 MG/DL (ref 0–100)
NONHDLC SERPL-MCNC: 133 MG/DL
TRIGL SERPL-MCNC: 227 MG/DL

## 2021-01-01 PROCEDURE — 36415 COLL VENOUS BLD VENIPUNCTURE: CPT

## 2021-01-01 PROCEDURE — 80061 LIPID PANEL: CPT

## 2021-01-01 PROCEDURE — 91306 COVID-19 MODERNA VACC 0.25 ML BOOSTER: CPT

## 2021-01-01 PROCEDURE — 83036 HEMOGLOBIN GLYCOSYLATED A1C: CPT

## 2021-01-01 PROCEDURE — 99213 OFFICE O/P EST LOW 20 MIN: CPT | Performed by: INTERNAL MEDICINE

## 2021-01-01 PROCEDURE — 0064A COVID-19 MODERNA VACC 0.25 ML BOOSTER: CPT

## 2021-01-01 RX ORDER — SIMVASTATIN 40 MG
40 TABLET ORAL DAILY
Qty: 90 TABLET | Refills: 1 | Status: SHIPPED | OUTPATIENT
Start: 2021-01-01 | End: 2021-01-01 | Stop reason: ALTCHOICE

## 2021-01-01 RX ORDER — ROSUVASTATIN CALCIUM 10 MG/1
10 TABLET, COATED ORAL DAILY
Qty: 90 TABLET | Refills: 2 | Status: SHIPPED | OUTPATIENT
Start: 2021-01-01 | End: 2022-01-01 | Stop reason: SDUPTHER

## 2021-01-01 RX ORDER — SIMVASTATIN 40 MG
40 TABLET ORAL DAILY
Qty: 90 TABLET | Refills: 0 | Status: SHIPPED | OUTPATIENT
Start: 2021-01-01 | End: 2021-01-01 | Stop reason: SDUPTHER

## 2021-02-13 ENCOUNTER — LAB (OUTPATIENT)
Dept: LAB | Age: 56
End: 2021-02-13
Payer: COMMERCIAL

## 2021-02-13 DIAGNOSIS — E78.00 HYPERCHOLESTEROLEMIA: ICD-10-CM

## 2021-02-13 LAB
ALBUMIN SERPL BCP-MCNC: 4.3 G/DL (ref 3.5–5)
ALP SERPL-CCNC: 95 U/L (ref 46–116)
ALT SERPL W P-5'-P-CCNC: 31 U/L (ref 12–78)
ANION GAP SERPL CALCULATED.3IONS-SCNC: 2 MMOL/L (ref 4–13)
AST SERPL W P-5'-P-CCNC: 15 U/L (ref 5–45)
BILIRUB SERPL-MCNC: 0.52 MG/DL (ref 0.2–1)
BUN SERPL-MCNC: 15 MG/DL (ref 5–25)
CALCIUM SERPL-MCNC: 9.9 MG/DL (ref 8.3–10.1)
CHLORIDE SERPL-SCNC: 107 MMOL/L (ref 100–108)
CHOLEST SERPL-MCNC: 169 MG/DL (ref 50–200)
CO2 SERPL-SCNC: 31 MMOL/L (ref 21–32)
CREAT SERPL-MCNC: 1 MG/DL (ref 0.6–1.3)
GFR SERPL CREATININE-BSD FRML MDRD: 84 ML/MIN/1.73SQ M
GLUCOSE P FAST SERPL-MCNC: 101 MG/DL (ref 65–99)
HDLC SERPL-MCNC: 44 MG/DL
LDLC SERPL CALC-MCNC: 107 MG/DL (ref 0–100)
NONHDLC SERPL-MCNC: 125 MG/DL
POTASSIUM SERPL-SCNC: 5.2 MMOL/L (ref 3.5–5.3)
PROT SERPL-MCNC: 8 G/DL (ref 6.4–8.2)
SODIUM SERPL-SCNC: 140 MMOL/L (ref 136–145)
TRIGL SERPL-MCNC: 89 MG/DL

## 2021-02-13 PROCEDURE — 80053 COMPREHEN METABOLIC PANEL: CPT

## 2021-02-13 PROCEDURE — 36415 COLL VENOUS BLD VENIPUNCTURE: CPT

## 2021-02-13 PROCEDURE — 80061 LIPID PANEL: CPT

## 2021-02-19 ENCOUNTER — OFFICE VISIT (OUTPATIENT)
Dept: INTERNAL MEDICINE CLINIC | Facility: CLINIC | Age: 56
End: 2021-02-19
Payer: COMMERCIAL

## 2021-02-19 VITALS
SYSTOLIC BLOOD PRESSURE: 108 MMHG | DIASTOLIC BLOOD PRESSURE: 80 MMHG | BODY MASS INDEX: 26.13 KG/M2 | OXYGEN SATURATION: 98 % | WEIGHT: 172.4 LBS | HEIGHT: 68 IN | TEMPERATURE: 97.1 F | HEART RATE: 68 BPM

## 2021-02-19 DIAGNOSIS — E78.00 HYPERCHOLESTEROLEMIA: Primary | ICD-10-CM

## 2021-02-19 DIAGNOSIS — E03.8 SUBCLINICAL HYPOTHYROIDISM: ICD-10-CM

## 2021-02-19 PROCEDURE — 3725F SCREEN DEPRESSION PERFORMED: CPT | Performed by: INTERNAL MEDICINE

## 2021-02-19 PROCEDURE — 1036F TOBACCO NON-USER: CPT | Performed by: INTERNAL MEDICINE

## 2021-02-19 PROCEDURE — 99213 OFFICE O/P EST LOW 20 MIN: CPT | Performed by: INTERNAL MEDICINE

## 2021-02-19 PROCEDURE — 3008F BODY MASS INDEX DOCD: CPT | Performed by: INTERNAL MEDICINE

## 2021-02-19 NOTE — PROGRESS NOTES
Assessment/Plan:    Subclinical hypothyroidism  tsh wnl  No symptoms  Hypercholesterolemia  Well controlled, continue statin  Diagnoses and all orders for this visit:    Hypercholesterolemia    Subclinical hypothyroidism          Subjective:      Patient ID: Lilibeth Trafalgar is a 63 y o  male  Regular office visit  No complaints at the time  The following portions of the patient's history were reviewed and updated as appropriate: allergies, current medications, past surgical history and problem list     Review of Systems   Constitutional: Negative  HENT: Negative  Eyes: Negative  Respiratory: Negative  Cardiovascular: Negative  Gastrointestinal: Negative  Endocrine: Negative  Genitourinary: Negative  Musculoskeletal: Negative  Allergic/Immunologic: Negative  Neurological: Negative  Hematological: Negative  Psychiatric/Behavioral: Negative  Objective:      /80 (BP Location: Left arm, Patient Position: Sitting, Cuff Size: Standard)   Pulse 68   Temp (!) 97 1 °F (36 2 °C) (Tympanic)   Ht 5' 7 84" (1 723 m)   Wt 78 2 kg (172 lb 6 4 oz)   SpO2 98%   BMI 26 34 kg/m²          Physical Exam  Constitutional:       Appearance: Normal appearance  HENT:      Head: Normocephalic  Nose: Nose normal       Mouth/Throat:      Mouth: Mucous membranes are moist    Eyes:      Extraocular Movements: Extraocular movements intact  Neck:      Musculoskeletal: Normal range of motion  Abdominal:      General: Abdomen is flat  Palpations: Abdomen is soft  Musculoskeletal: Normal range of motion  Skin:     General: Skin is warm  Capillary Refill: Capillary refill takes less than 2 seconds  Neurological:      General: No focal deficit present  Mental Status: He is alert and oriented to person, place, and time     Psychiatric:         Mood and Affect: Mood normal          Behavior: Behavior normal

## 2021-03-28 ENCOUNTER — IMMUNIZATIONS (OUTPATIENT)
Dept: FAMILY MEDICINE CLINIC | Facility: HOSPITAL | Age: 56
End: 2021-03-28

## 2021-03-28 DIAGNOSIS — Z23 ENCOUNTER FOR IMMUNIZATION: Primary | ICD-10-CM

## 2021-03-28 PROCEDURE — 0001A SARS-COV-2 / COVID-19 MRNA VACCINE (PFIZER-BIONTECH) 30 MCG: CPT

## 2021-03-28 PROCEDURE — 91300 SARS-COV-2 / COVID-19 MRNA VACCINE (PFIZER-BIONTECH) 30 MCG: CPT

## 2021-04-19 ENCOUNTER — IMMUNIZATIONS (OUTPATIENT)
Dept: FAMILY MEDICINE CLINIC | Facility: HOSPITAL | Age: 56
End: 2021-04-19

## 2021-04-19 DIAGNOSIS — Z23 ENCOUNTER FOR IMMUNIZATION: Primary | ICD-10-CM

## 2021-04-19 PROCEDURE — 0002A SARS-COV-2 / COVID-19 MRNA VACCINE (PFIZER-BIONTECH) 30 MCG: CPT

## 2021-04-19 PROCEDURE — 91300 SARS-COV-2 / COVID-19 MRNA VACCINE (PFIZER-BIONTECH) 30 MCG: CPT

## 2021-09-03 PROBLEM — E03.8 SUBCLINICAL HYPOTHYROIDISM: Status: RESOLVED | Noted: 2017-12-28 | Resolved: 2021-01-01

## 2021-09-03 NOTE — PROGRESS NOTES
Assessment/Plan:    Subclinical hypothyroidism    Patient has been euthyroid for the past 4 years  We will remove this diagnosis    Hypercholesterolemia    Discontinue simvastatin when current supply is exhausted and initiate treatment with rosuvastatin 10 mg daily  Follow-up lipid profile after approximately 6 months on rosuvastatin  Patient just renewed a 90 day supply of his  simvastatin       Diagnoses and all orders for this visit:    Encounter for screening for malignant neoplasm of colon  -     Cologuard    Hypercholesterolemia  -     rosuvastatin (CRESTOR) 10 MG tablet; Take 1 tablet (10 mg total) by mouth daily  -     CBC and Platelet; Future  -     Comprehensive metabolic panel; Future    Subclinical hypothyroidism          Subjective:      Patient ID: Get Queen is a 63 y o  male  Patient presents to the office for follow-up visit  He has no particular complaints at this time  He had recent labs drawn as part of the [employer] employee health program  Hemoglobin A1cs in the nondiabetic range  Lipid panel is significant for a total cholesterol level which is at goal   Mildly elevated triglyceride 227, low HDL cholesterol of 33 and an LDL cholesterol which is at goal at 88  Family History   Problem Relation Age of Onset    Stroke Mother         CVA    Hypertension Mother         Essential    Hyperlipidemia Mother     Hypertension Father         Essential    Hyperlipidemia Father      Social History     Socioeconomic History    Marital status: /Civil Union     Spouse name: Not on file    Number of children: Not on file    Years of education: Not on file    Highest education level: Not on file   Occupational History    Not on file   Tobacco Use    Smoking status: Never Smoker    Smokeless tobacco: Never Used   Vaping Use    Vaping Use: Never used   Substance and Sexual Activity    Alcohol use:  Yes     Alcohol/week: 2 0 standard drinks     Types: 2 Shots of liquor per week     Comment: Social    Drug use: No    Sexual activity: Yes   Other Topics Concern    Not on file   Social History Narrative    Not on file     Social Determinants of Health     Financial Resource Strain:     Difficulty of Paying Living Expenses:    Food Insecurity:     Worried About Running Out of Food in the Last Year:     [address] in the Last Year:    Transportation Needs:     Lack of Transportation (Medical):      Lack of Transportation (Non-Medical):    Physical Activity:     Days of Exercise per Week:     Minutes of Exercise per Session:    Stress:     Feeling of Stress :    Social Connections:     Frequency of Communication with Friends and Family:     Frequency of Social Gatherings with Friends and Family:     Attends [employer] Services:     Active Member of Clubs or Organizations:     Attends Club or Organization Meetings:     Marital Status:    Intimate Partner Violence:     Fear of Current or Ex-Partner:     Emotionally Abused:     Physically Abused:     Sexually Abused:      Past Medical History:   Diagnosis Date    Chronic kidney disease 2017    Hematuria     Hyperlipidemia     Kidney stones 2017    Raynaud's phenomenon     Renal disorder     Subclinical hypothyroidism 12/28/2017       Current Outpatient Medications:     Multiple Vitamins-Minerals (MULTIVITAL PO), Take by mouth 2 (two) times a day, Disp: , Rfl:     Omega-3 Fatty Acids (FISH OIL) 1000 MG CPDR, Take by mouth daily, Disp: , Rfl: 0    meloxicam (MOBIC) 15 mg tablet, Take 1 tablet (15 mg total) by mouth daily (Patient not taking: Reported on 2/19/2021), Disp: 30 tablet, Rfl: 1    [START ON 11/29/2021] rosuvastatin (CRESTOR) 10 MG tablet, Take 1 tablet (10 mg total) by mouth daily, Disp: 90 tablet, Rfl: 2  Allergies   Allergen Reactions    Prednazoline Dermatitis, Hives, Itching and Rash     Past Surgical History:   Procedure Laterality Date    CYSTOSCOPY  2017    KIDNEY STONE SURGERY  KNEE ARTHROSCOPY Left 06/06/2005    w/Medial Meniscectomy    AK CYSTO/URETERO W/LITHOTRIPSY &INDWELL STENT INSRT Right 2/14/2019    Procedure: CYSTOSCOPY URETEROSCOPY WITH LITHOTRIPSY HOLMIUM LASER, RETROGRADE PYELOGRAM AND INSERTION STENT URETERAL, stone basket extraction;  Surgeon: Rosales Friedman MD;  Location: [address] [place] [address] MAIN OR;  Service: Urology    AK CYSTO/URETERO/PYELOSCOPY W/LITHOTRIPSY Left 2/9/2017    Procedure: HOLMIUM LASER, URETEROSOCPY ;  Surgeon: Amy Crawford MD;  Location: [facility];  Service: Urology    AK CYSTOURETHROSCOPY,URETER CATHETER Left 2/9/2017    Procedure: CYSTOSCOPY RETROGRADE PYELOGRAM WITH STENT INSERTION;  Surgeon: Amy Crawford MD;  Location: [facility] MA[facility];  Service: Urology         Review of Systems   Constitutional: Negative  HENT: Negative  Eyes: Negative  Respiratory: Negative  Cardiovascular: Negative  Gastrointestinal: Negative  Endocrine: Negative  Genitourinary: Negative  Musculoskeletal: Negative  Skin: Negative  Allergic/Immunologic: Negative  Neurological: Negative  Hematological: Negative  Psychiatric/Behavioral: Negative  Objective:      /80   Pulse 64   Temp 98 4 °F (36 9 °C) (Tympanic)   Ht 5' 7 72" (1 72 m)   Wt 79 1 kg (174 lb 6 4 oz)   SpO2 98%   BMI 26 74 kg/m²  BMI Counseling: Body mass index is 26 74 kg/m²  The BMI is above normal  Nutrition recommendations include decreasing overall calorie intake, 3-5 servings of fruits/vegetables daily, consuming healthier snacks, moderation in carbohydrate intake, increasing intake of lean protein, reducing intake of saturated fat and trans fat and reducing intake of cholesterol           Physical Exam

## 2021-09-04 NOTE — ASSESSMENT & PLAN NOTE
Discontinue simvastatin when current supply is exhausted and initiate treatment with rosuvastatin 10 mg daily  Follow-up lipid profile after approximately 6 months on rosuvastatin    Patient just renewed a 90 day supply of his  simvastatin

## 2022-01-01 ENCOUNTER — HOSPITAL ENCOUNTER (EMERGENCY)
Facility: HOSPITAL | Age: 57
End: 2022-04-22
Attending: EMERGENCY MEDICINE | Admitting: EMERGENCY MEDICINE
Payer: COMMERCIAL

## 2022-01-01 DIAGNOSIS — E78.00 HYPERCHOLESTEROLEMIA: ICD-10-CM

## 2022-01-01 DIAGNOSIS — T14.90XA BLUNT TRAUMA: Primary | ICD-10-CM

## 2022-01-01 LAB
ABO GROUP BLD BPU: NORMAL
BPU ID: NORMAL
UNIT DISPENSE STATUS: NORMAL
UNIT PRODUCT CODE: NORMAL
UNIT PRODUCT VOLUME: 350 ML
UNIT RH: NORMAL

## 2022-01-01 PROCEDURE — 31500 INSERT EMERGENCY AIRWAY: CPT | Performed by: EMERGENCY MEDICINE

## 2022-01-01 PROCEDURE — 36556 INSERT NON-TUNNEL CV CATH: CPT | Performed by: EMERGENCY MEDICINE

## 2022-01-01 PROCEDURE — 92950 HEART/LUNG RESUSCITATION CPR: CPT

## 2022-01-01 PROCEDURE — 32551 INSERTION OF CHEST TUBE: CPT | Performed by: EMERGENCY MEDICINE

## 2022-01-01 PROCEDURE — 99291 CRITICAL CARE FIRST HOUR: CPT | Performed by: EMERGENCY MEDICINE

## 2022-01-01 PROCEDURE — 99285 EMERGENCY DEPT VISIT HI MDM: CPT

## 2022-01-01 PROCEDURE — 31500 INSERT EMERGENCY AIRWAY: CPT

## 2022-01-01 PROCEDURE — 32551 INSERTION OF CHEST TUBE: CPT

## 2022-01-01 PROCEDURE — 92950 HEART/LUNG RESUSCITATION CPR: CPT | Performed by: EMERGENCY MEDICINE

## 2022-01-01 RX ORDER — EPINEPHRINE 0.1 MG/ML
SYRINGE (ML) INJECTION CODE/TRAUMA/SEDATION MEDICATION
Status: COMPLETED | OUTPATIENT
Start: 2022-01-01 | End: 2022-01-01

## 2022-01-01 RX ORDER — ROSUVASTATIN CALCIUM 10 MG/1
10 TABLET, COATED ORAL DAILY
Qty: 90 TABLET | Refills: 1 | Status: SHIPPED | OUTPATIENT
Start: 2022-01-01

## 2022-01-01 RX ADMIN — EPINEPHRINE 0.1 MG: 0.1 INJECTION INTRACARDIAC; INTRAVENOUS at 17:28

## 2022-01-01 RX ADMIN — SODIUM CHLORIDE 1000 ML: 0.9 INJECTION, SOLUTION INTRAVENOUS at 17:25

## 2022-01-01 RX ADMIN — EPINEPHRINE 0.1 MG: 0.1 INJECTION INTRACARDIAC; INTRAVENOUS at 17:34

## 2022-01-01 RX ADMIN — EPINEPHRINE 0.1 MG: 0.1 INJECTION INTRACARDIAC; INTRAVENOUS at 17:31

## 2022-01-01 RX ADMIN — EPINEPHRINE 0.1 MG: 0.1 INJECTION INTRACARDIAC; INTRAVENOUS at 17:38
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
## 2022-04-22 NOTE — ED PROCEDURE NOTE
PROCEDURE  Intubation    Date/Time: 4/22/2022 5:47 PM  Performed by: Omar De Oliveira DO  Authorized by: Omar De Oliveira DO     Patient location:  ED  Other Assisting Provider: No    Consent:     Consent obtained:  Emergent situation  Universal protocol:     Patient identity confirmed:  Hospital-assigned identification number  Pre-procedure details:     Patient status:  Unresponsive    Pretreatment medications:  None    Paralytics:  None  Indications:     Indications for intubation: respiratory distress and respiratory failure    Procedure details:     Preoxygenation:  Bag valve mask    CPR in progress: yes      Intubation method:  Oral    Oral intubation technique:  Glidescope    Laryngoscope blade: Mac 3    Tube size (mm):  8 0    Tube type:  Cuffed    Number of attempts:  1    Ventilation between attempts: no      Cricoid pressure: no      Tube visualized through cords: yes    Placement assessment:     ETT to lip:  24    Tube secured with:  ETT whitehead    Breath sounds:  Diminished    Placement verification: condensation, direct visualization and ETCO2 detector      CXR findings:  ETT in proper place  Post-procedure details:     Patient tolerance of procedure: Tolerated well, no immediate complications  Central Line    Date/Time: 4/22/2022 5:47 PM  Performed by: Omar De Oliveira DO  Authorized by: Omar De Oliveira DO     Patient location:  ED  Consent:     Consent obtained:  Emergent situation  Universal protocol:     Patient identity confirmed:  Hospital-assigned identification number  Indications:     Central line indications: medications requiring central line and hemodynamic monitoring      Site selection rationale:  Compressions being performed  Anesthesia (see MAR for exact dosages):      Anesthesia method:  None  Procedure details:     Location:  Left femoral    Vessel type: vein      Laterality:  Left    Approach: percutaneous technique used      Patient position:  Flat    Catheter type:  Cordis    Catheter size:  9 Fr    Landmarks identified: yes      Ultrasound guidance: no      Number of attempts:  1    Successful placement: yes    Post-procedure details:     Assessment:  Blood return through all ports    Patient tolerance of procedure: Tolerated well, no immediate complications  Chest Tube    Date/Time: 4/22/2022 5:49 PM  Performed by: Nasir Portillo DO  Authorized by: Nasir Portillo DO     Patient location:  ED  Other Assisting Provider: No    Consent:     Consent obtained:  Emergent situation  Universal protocol:     Patient identity confirmed:  Hospital-assigned identification number  Indications:     Indications: pneumothroax and hemothorax    Anesthesia (see MAR for exact dosages): Anesthesia method:  None  Procedure details:     Placement location:  Lateral    Laterality:  Left    Approach:  Open    Scalpel size:  10    Tube size (Fr):  32    Dissection instrument:  Finger    Ultrasound guidance: no      Needle Decompression: no      Drainage characteristics:  Air only  Post-procedure details:     Post-insertion x-ray findings: tube in good position      Patient tolerance of procedure:   Tolerated well, no immediate complications         Nasir Portillo DO  04/22/22 [id number]
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
## 2022-04-22 NOTE — ED PROCEDURE NOTE
Procedure  Chest Tube    Date/Time: 4/22/2022 5:30 PM  Performed by: Peng Franco PA-C  Authorized by: Peng Franco PA-C     Patient location:  ED  Consent:     Consent obtained:  Emergent situation  Universal protocol:     Patient identity confirmed: Anonymous protocol, patient vented/unresponsive  Indications:     Indications: pneumothroax and hemothorax    Anesthesia (see MAR for exact dosages): Anesthesia method:  None  Procedure details:     Placement location:  Lateral    Laterality:  Right    Approach:  Open    Scalpel size:  10    Tube size (Fr):  32    Dissection instrument:  Finger    Ultrasound guidance: no      Needle Decompression: no      Drainage characteristics:  Air only  Post-procedure details:     Patient tolerance of procedure:   Tolerated well, no immediate complications                     Peng Franco PA-C  04/22/22 1942
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
## 2022-04-23 NOTE — ED NOTES
Faxed facesheet, H&P, and ER summary to [facility] [address], [address] [facility]  04/22/22 2036

## 2025-07-05 NOTE — PATIENT INSTRUCTIONS
Continue with statin and fish oil  Repeat lipids in 6 months  If still elevated at that time - will start fenofibrate Denies

## (undated) DEVICE — PACK TUR

## (undated) DEVICE — UROCATCH BAG

## (undated) DEVICE — ENDOSCOPIC VALVE WITH ADAPTER.: Brand: SURSEAL® II

## (undated) DEVICE — GUIDEWIRE .038 X 145

## (undated) DEVICE — STERILE POLYISOPRENE POWDER-FREE SURGICAL GLOVES: Brand: PROTEXIS

## (undated) DEVICE — GUIDEWIRE STRGHT TIP 0.035 IN  SOLO PLUS

## (undated) DEVICE — SYRINGE 10ML LL

## (undated) DEVICE — BASKET SPECIMEN RETRIVAL 1.9FR 120CM

## (undated) DEVICE — SPECIMEN CONTAINER STERILE PEEL PACK

## (undated) DEVICE — GARMENT,MEDLINE,DVT,INT,CALF,FOAM,MED: Brand: MEDLINE

## (undated) DEVICE — TUBING SUCTION 5MM X 12 FT

## (undated) DEVICE — GLOVE SRG BIOGEL ECLIPSE 8

## (undated) DEVICE — LUBRICANT SURGILUBE TUBE 4 OZ  FLIP TOP

## (undated) DEVICE — GLOVE INDICATOR PI UNDERGLOVE SZ 8 BLUE

## (undated) DEVICE — SPONGE PVP SCRUB WING STERILE

## (undated) DEVICE — GLOVE SURG DERMASSURE LF 6.5

## (undated) DEVICE — CATH URETERAL 5FR X 70 CM FLEX TIP POLYUR BARD

## (undated) DEVICE — BASKET STONE RETRIEVER HELICAL 3FR

## (undated) DEVICE — FIBER HOLMIUM 200

## (undated) DEVICE — LASER FIBER HOLMIUM 272MICRON

## (undated) DEVICE — 4-PORT MANIFOLD: Brand: NEPTUNE 2